# Patient Record
Sex: FEMALE | Race: WHITE | NOT HISPANIC OR LATINO | Employment: FULL TIME | URBAN - METROPOLITAN AREA
[De-identification: names, ages, dates, MRNs, and addresses within clinical notes are randomized per-mention and may not be internally consistent; named-entity substitution may affect disease eponyms.]

---

## 2017-08-18 ENCOUNTER — TRANSCRIBE ORDERS (OUTPATIENT)
Dept: ADMINISTRATIVE | Facility: HOSPITAL | Age: 57
End: 2017-08-18

## 2017-08-18 DIAGNOSIS — C50.911 MALIGNANT NEOPLASM OF RIGHT FEMALE BREAST, UNSPECIFIED SITE OF BREAST: Primary | ICD-10-CM

## 2017-11-14 ENCOUNTER — TRANSCRIBE ORDERS (OUTPATIENT)
Dept: ADMINISTRATIVE | Facility: HOSPITAL | Age: 57
End: 2017-11-14

## 2017-11-14 DIAGNOSIS — N31.0 UNINHIBITED NEUROPATHIC BLADDER, NOT ELSEWHERE CLASSIFIED: ICD-10-CM

## 2017-11-14 DIAGNOSIS — N31.9 NEUROGENIC DYSFUNCTION OF THE URINARY BLADDER: Primary | ICD-10-CM

## 2017-11-16 ENCOUNTER — HOSPITAL ENCOUNTER (OUTPATIENT)
Dept: ULTRASOUND IMAGING | Facility: HOSPITAL | Age: 57
Discharge: HOME | End: 2017-11-16
Attending: UROLOGY
Payer: COMMERCIAL

## 2017-11-16 DIAGNOSIS — N31.9 NEUROGENIC DYSFUNCTION OF THE URINARY BLADDER: ICD-10-CM

## 2017-11-16 DIAGNOSIS — N31.0 UNINHIBITED NEUROPATHIC BLADDER, NOT ELSEWHERE CLASSIFIED: ICD-10-CM

## 2017-11-16 PROCEDURE — 76770 US EXAM ABDO BACK WALL COMP: CPT

## 2018-03-14 ENCOUNTER — TELEPHONE (OUTPATIENT)
Dept: UROLOGY | Facility: CLINIC | Age: 58
End: 2018-03-14

## 2018-04-25 ENCOUNTER — TELEPHONE (OUTPATIENT)
Dept: UROLOGY | Facility: CLINIC | Age: 58
End: 2018-04-25

## 2018-04-25 NOTE — TELEPHONE ENCOUNTER
BUCKY for patient to call back to schedule previously cancelled CMG    5/3/18 4:20 pm, patient returned call to schedule CMG  Spoke to patient, would like to schedule appointment in June, but doesn't know when she is available  She will call us back when she has her schedule

## 2018-06-11 ENCOUNTER — TRANSCRIBE ORDERS (OUTPATIENT)
Dept: ADMINISTRATIVE | Facility: HOSPITAL | Age: 58
End: 2018-06-11

## 2018-06-11 DIAGNOSIS — N31.9 NEUROGENIC DYSFUNCTION OF THE URINARY BLADDER: Primary | ICD-10-CM

## 2018-06-15 ENCOUNTER — APPOINTMENT (OUTPATIENT)
Dept: LAB | Facility: CLINIC | Age: 58
End: 2018-06-15
Payer: COMMERCIAL

## 2018-06-15 DIAGNOSIS — N31.9 NEUROGENIC DYSFUNCTION OF THE URINARY BLADDER: ICD-10-CM

## 2018-06-15 LAB
ANION GAP SERPL CALCULATED.3IONS-SCNC: 10 MMOL/L (ref 4–13)
BUN SERPL-MCNC: 16 MG/DL (ref 5–25)
CALCIUM SERPL-MCNC: 8.7 MG/DL (ref 8.3–10.1)
CHLORIDE SERPL-SCNC: 100 MMOL/L (ref 100–108)
CO2 SERPL-SCNC: 28 MMOL/L (ref 21–32)
CREAT SERPL-MCNC: 0.86 MG/DL (ref 0.6–1.3)
GFR SERPL CREATININE-BSD FRML MDRD: 74 ML/MIN/1.73SQ M
GLUCOSE SERPL-MCNC: 114 MG/DL (ref 65–140)
POTASSIUM SERPL-SCNC: 3.7 MMOL/L (ref 3.5–5.3)
SODIUM SERPL-SCNC: 138 MMOL/L (ref 136–145)

## 2018-06-15 PROCEDURE — 36415 COLL VENOUS BLD VENIPUNCTURE: CPT

## 2018-06-15 PROCEDURE — 80048 BASIC METABOLIC PNL TOTAL CA: CPT

## 2018-06-19 ENCOUNTER — OFFICE VISIT (OUTPATIENT)
Dept: UROLOGY | Facility: CLINIC | Age: 58
End: 2018-06-19
Payer: COMMERCIAL

## 2018-06-19 VITALS
HEIGHT: 66 IN | SYSTOLIC BLOOD PRESSURE: 120 MMHG | DIASTOLIC BLOOD PRESSURE: 64 MMHG | BODY MASS INDEX: 23.95 KG/M2 | WEIGHT: 149 LBS

## 2018-06-19 DIAGNOSIS — N39.0 URINARY TRACT INFECTION WITHOUT HEMATURIA, SITE UNSPECIFIED: Primary | ICD-10-CM

## 2018-06-19 DIAGNOSIS — N31.9 NEUROMUSCULAR DYSFUNCTION OF BLADDER: ICD-10-CM

## 2018-06-19 LAB
SL AMB  POCT GLUCOSE, UA: NORMAL
SL AMB LEUKOCYTE ESTERASE,UA: NORMAL
SL AMB POCT BILIRUBIN,UA: NORMAL
SL AMB POCT BLOOD,UA: NORMAL
SL AMB POCT CLARITY,UA: CLEAR
SL AMB POCT COLOR,UA: YELLOW
SL AMB POCT KETONES,UA: NORMAL
SL AMB POCT NITRITE,UA: NORMAL
SL AMB POCT PH,UA: NORMAL
SL AMB POCT SPECIFIC GRAVITY,UA: NORMAL
SL AMB POCT URINE PROTEIN: NORMAL
SL AMB POCT UROBILINOGEN: NORMAL

## 2018-06-19 PROCEDURE — 99213 OFFICE O/P EST LOW 20 MIN: CPT | Performed by: UROLOGY

## 2018-06-19 PROCEDURE — 81002 URINALYSIS NONAUTO W/O SCOPE: CPT | Performed by: UROLOGY

## 2018-06-19 RX ORDER — CYANOCOBALAMIN (VITAMIN B-12) 500 MCG
400 LOZENGE ORAL
COMMUNITY

## 2018-06-19 RX ORDER — ASCORBATE CALCIUM 500 MG
500 TABLET ORAL
COMMUNITY

## 2018-06-19 RX ORDER — LETROZOLE 2.5 MG/1
2.5 TABLET, FILM COATED ORAL DAILY
COMMUNITY

## 2018-06-19 RX ORDER — CHLORAL HYDRATE 500 MG
1000 CAPSULE ORAL DAILY
COMMUNITY
End: 2019-05-20 | Stop reason: SDUPTHER

## 2018-06-19 NOTE — PROGRESS NOTES
Progress Note - Urology  Milly Kline 60 y o  female MRN: [id number]  Encounter: [id number]      Chief Complaint:   Chief Complaint   Patient presents with    Urinary Tract Infection     6 Week FU / Labs       HPI:   this is a 54-year-old female with a past history of cervical cancer treated with surgery and radiation  In addition she has had breast cancer  She has had voiding dysfunction and has been on intermittent catheterization for inability to empty  She caths 3 times a day  She states she is able to void when having a bowel movement     Laboratory data he has remained stable in view of renal function  She has had no bleeding  She has had no UTI symptoms  MEDS:    Current Outpatient Prescriptions:     aspirin 81 MG tablet, Take 81 mg by mouth, Disp: , Rfl:     Calcium Ascorbate 500 MG TABS, Take 500 mg by mouth, Disp: , Rfl:     Calcium Carb-Cholecalciferol (CALCIUM CARBONATE-VITAMIN D3 PO), Take by mouth, Disp: , Rfl:     Cranberry 1000 MG CAPS, Take 4,200 mg by mouth, Disp: , Rfl:     letrozole (FEMARA) 2 5 mg tablet, Take 2 5 mg by mouth daily, Disp: , Rfl:     Omega-3 Fatty Acids (FISH OIL) 1,000 mg, Take 1,000 mg by mouth daily, Disp: , Rfl:     Vitamin E 400 units TABS, Take 400 Units by mouth, Disp: , Rfl:       PMH:  Past Medical History:   Diagnosis Date    Breast cancer ([facility] )     Stage 1    Cervical cancer ([facility] )          PSH  Past Surgical History:   Procedure Laterality Date    BREAST LUMPECTOMY Right 09/26/2017    BREAST LUMPECTOMY      PARTIAL HYSTERECTOMY           ROS:  Review of Systems      Vitals:  Blood pressure 120/64, height 5' 6" (1 676 m), weight 67 6 kg (149 lb)  Physical Exam:     Exam shows a pleasant female in no acute distress  Her back shows no CVA tenderness  The abdomen is soft  Appreciate no masses  There is no inguinal adenopathy    The bladder is not distended pelvic examination reveals an obliterated vaginal canal   There is hyperemia noted around the vaginal introitus  Lab, Imaging and other studies:  Recent Results (from the past 672 hour(s))   Basic metabolic panel    Collection Time: 06/15/18  5:07 PM   Result Value Ref Range    Sodium 138 136 - 145 mmol/L    Potassium 3 7 3 5 - 5 3 mmol/L    Chloride 100 100 - 108 mmol/L    CO2 28 21 - 32 mmol/L    Anion Gap 10 4 - 13 mmol/L    BUN 16 5 - 25 mg/dL    Creatinine 0 86 0 60 - 1 30 mg/dL    Glucose 114 65 - 140 mg/dL    Calcium 8 7 8 3 - 10 1 mg/dL    eGFR 74 ml/min/1 73sq m   POCT urine dip    Collection Time: 06/19/18  4:13 PM   Result Value Ref Range    LEUKOCYTE ESTERASE,UA NEG      NITRITE,UA NEG     SL AMB POCT UROBILINOGEN NEG     SL AMB POCT URINE PROTEIN NEG      PH,UA NEG      BLOOD,UA NEG      SPECIFIC GRAVITY,UA NEG      KETONES,UA NEG      BILIRUBIN,UA NEG     GLUCOSE, UA NEG      COLOR,UA Yellow      CLARITY,UA Clear            IMPRESSION:    Neurogenic bladder    PLAN:   I would recheck her in 6 months get a renal ultrasound at that time  She will continue with intermittent catheterization which she does 3 times daily  She will also catheter additional time if she feels distension and pressure

## 2018-07-21 ENCOUNTER — TRANSCRIBE ORDERS (OUTPATIENT)
Dept: LAB | Facility: CLINIC | Age: 58
End: 2018-07-21

## 2018-07-21 ENCOUNTER — APPOINTMENT (OUTPATIENT)
Dept: LAB | Facility: CLINIC | Age: 58
End: 2018-07-21
Payer: COMMERCIAL

## 2018-07-21 DIAGNOSIS — Z17.0 ESTROGEN RECEPTOR POSITIVE: ICD-10-CM

## 2018-07-21 DIAGNOSIS — E78.2: Primary | ICD-10-CM

## 2018-07-21 DIAGNOSIS — C50.411 CARCINOMA OF UPPER-OUTER QUADRANT OF FEMALE BREAST, RIGHT (HCC): ICD-10-CM

## 2018-07-21 DIAGNOSIS — E78.2: ICD-10-CM

## 2018-07-21 LAB
ALBUMIN SERPL BCP-MCNC: 3.9 G/DL (ref 3.5–5)
ALP SERPL-CCNC: 72 U/L (ref 46–116)
ALT SERPL W P-5'-P-CCNC: 116 U/L (ref 12–78)
AST SERPL W P-5'-P-CCNC: 87 U/L (ref 5–45)
BILIRUB DIRECT SERPL-MCNC: 0.14 MG/DL (ref 0–0.2)
BILIRUB SERPL-MCNC: 0.8 MG/DL (ref 0.2–1)
CHOLEST SERPL-MCNC: 286 MG/DL (ref 50–200)
HDLC SERPL-MCNC: 63 MG/DL (ref 40–60)
LDLC SERPL CALC-MCNC: 178 MG/DL (ref 0–100)
NONHDLC SERPL-MCNC: 223 MG/DL
PROT SERPL-MCNC: 7.7 G/DL (ref 6.4–8.2)
TRIGL SERPL-MCNC: 225 MG/DL

## 2018-07-21 PROCEDURE — 80076 HEPATIC FUNCTION PANEL: CPT

## 2018-07-21 PROCEDURE — 36415 COLL VENOUS BLD VENIPUNCTURE: CPT

## 2018-07-21 PROCEDURE — 80061 LIPID PANEL: CPT

## 2018-11-02 ENCOUNTER — APPOINTMENT (OUTPATIENT)
Dept: LAB | Facility: CLINIC | Age: 58
End: 2018-11-02
Payer: COMMERCIAL

## 2018-11-02 ENCOUNTER — TRANSCRIBE ORDERS (OUTPATIENT)
Dept: LAB | Facility: CLINIC | Age: 58
End: 2018-11-02

## 2018-11-02 DIAGNOSIS — Z17.0 ESTROGEN RECEPTOR POSITIVE: ICD-10-CM

## 2018-11-02 DIAGNOSIS — C50.411 MALIGNANT NEOPLASM OF UPPER-OUTER QUADRANT OF RIGHT BREAST IN FEMALE, ESTROGEN RECEPTOR POSITIVE (HCC): Primary | ICD-10-CM

## 2018-11-02 DIAGNOSIS — R79.89 ELEVATED LFTS: ICD-10-CM

## 2018-11-02 DIAGNOSIS — R94.5 NONSPECIFIC ABNORMAL RESULTS OF LIVER FUNCTION STUDY: ICD-10-CM

## 2018-11-02 DIAGNOSIS — C50.411 MALIGNANT NEOPLASM OF UPPER-OUTER QUADRANT OF RIGHT BREAST IN FEMALE, ESTROGEN RECEPTOR POSITIVE (HCC): ICD-10-CM

## 2018-11-02 DIAGNOSIS — Z17.0 MALIGNANT NEOPLASM OF UPPER-OUTER QUADRANT OF RIGHT BREAST IN FEMALE, ESTROGEN RECEPTOR POSITIVE (HCC): Primary | ICD-10-CM

## 2018-11-02 DIAGNOSIS — Z17.0 MALIGNANT NEOPLASM OF UPPER-OUTER QUADRANT OF RIGHT BREAST IN FEMALE, ESTROGEN RECEPTOR POSITIVE (HCC): ICD-10-CM

## 2018-11-02 DIAGNOSIS — E78.2 MIXED HYPERLIPIDEMIA: ICD-10-CM

## 2018-11-02 DIAGNOSIS — E78.2 MIXED HYPERLIPIDEMIA: Primary | ICD-10-CM

## 2018-11-02 LAB
ALBUMIN SERPL BCP-MCNC: 4.1 G/DL (ref 3.5–5)
ALP SERPL-CCNC: 72 U/L (ref 46–116)
ALT SERPL W P-5'-P-CCNC: 41 U/L (ref 12–78)
ANION GAP SERPL CALCULATED.3IONS-SCNC: 11 MMOL/L (ref 4–13)
AST SERPL W P-5'-P-CCNC: 29 U/L (ref 5–45)
BILIRUB DIRECT SERPL-MCNC: 0.2 MG/DL (ref 0–0.2)
BILIRUB SERPL-MCNC: 1.1 MG/DL (ref 0.2–1)
BUN SERPL-MCNC: 19 MG/DL (ref 5–25)
CALCIUM SERPL-MCNC: 9.5 MG/DL (ref 8.3–10.1)
CHLORIDE SERPL-SCNC: 104 MMOL/L (ref 100–108)
CHOLEST SERPL-MCNC: 168 MG/DL (ref 50–200)
CO2 SERPL-SCNC: 28 MMOL/L (ref 21–32)
CREAT SERPL-MCNC: 0.89 MG/DL (ref 0.6–1.3)
GFR SERPL CREATININE-BSD FRML MDRD: 71 ML/MIN/1.73SQ M
GLUCOSE P FAST SERPL-MCNC: 106 MG/DL (ref 65–99)
HCV AB SER QL: NORMAL
HDLC SERPL-MCNC: 76 MG/DL (ref 40–60)
LDLC SERPL CALC-MCNC: 60 MG/DL (ref 0–100)
NONHDLC SERPL-MCNC: 92 MG/DL
POTASSIUM SERPL-SCNC: 3.9 MMOL/L (ref 3.5–5.3)
PROT SERPL-MCNC: 8 G/DL (ref 6.4–8.2)
SODIUM SERPL-SCNC: 143 MMOL/L (ref 136–145)
TRIGL SERPL-MCNC: 161 MG/DL

## 2018-11-02 PROCEDURE — 80061 LIPID PANEL: CPT

## 2018-11-02 PROCEDURE — 86803 HEPATITIS C AB TEST: CPT

## 2018-11-02 PROCEDURE — 80053 COMPREHEN METABOLIC PANEL: CPT

## 2018-11-02 PROCEDURE — 36415 COLL VENOUS BLD VENIPUNCTURE: CPT

## 2018-11-02 PROCEDURE — 82248 BILIRUBIN DIRECT: CPT

## 2018-12-19 ENCOUNTER — HOSPITAL ENCOUNTER (OUTPATIENT)
Dept: ULTRASOUND IMAGING | Facility: HOSPITAL | Age: 58
Discharge: HOME | End: 2018-12-19
Payer: COMMERCIAL

## 2018-12-19 DIAGNOSIS — N31.9 NEUROMUSCULAR DYSFUNCTION OF BLADDER: ICD-10-CM

## 2018-12-19 PROCEDURE — 76770 US EXAM ABDO BACK WALL COMP: CPT

## 2018-12-28 ENCOUNTER — OFFICE VISIT (OUTPATIENT)
Dept: UROLOGY | Facility: CLINIC | Age: 58
End: 2018-12-28
Payer: COMMERCIAL

## 2018-12-28 VITALS
SYSTOLIC BLOOD PRESSURE: 110 MMHG | HEIGHT: 66 IN | BODY MASS INDEX: 23.63 KG/M2 | WEIGHT: 147 LBS | DIASTOLIC BLOOD PRESSURE: 68 MMHG

## 2018-12-28 DIAGNOSIS — N39.0 URINARY TRACT INFECTION WITHOUT HEMATURIA, SITE UNSPECIFIED: Primary | ICD-10-CM

## 2018-12-28 LAB
SL AMB  POCT GLUCOSE, UA: ABNORMAL
SL AMB LEUKOCYTE ESTERASE,UA: ABNORMAL
SL AMB POCT BILIRUBIN,UA: ABNORMAL
SL AMB POCT BLOOD,UA: ABNORMAL
SL AMB POCT CLARITY,UA: ABNORMAL
SL AMB POCT COLOR,UA: YELLOW
SL AMB POCT KETONES,UA: ABNORMAL
SL AMB POCT NITRITE,UA: ABNORMAL
SL AMB POCT PH,UA: 7
SL AMB POCT SPECIFIC GRAVITY,UA: 1.01
SL AMB POCT URINE PROTEIN: ABNORMAL
SL AMB POCT UROBILINOGEN: ABNORMAL

## 2018-12-28 PROCEDURE — P9612 CATHETERIZE FOR URINE SPEC: HCPCS | Performed by: PHYSICIAN ASSISTANT

## 2018-12-28 PROCEDURE — 87077 CULTURE AEROBIC IDENTIFY: CPT | Performed by: PHYSICIAN ASSISTANT

## 2018-12-28 PROCEDURE — 81002 URINALYSIS NONAUTO W/O SCOPE: CPT | Performed by: PHYSICIAN ASSISTANT

## 2018-12-28 PROCEDURE — 99213 OFFICE O/P EST LOW 20 MIN: CPT | Performed by: PHYSICIAN ASSISTANT

## 2018-12-28 PROCEDURE — 87186 SC STD MICRODIL/AGAR DIL: CPT | Performed by: PHYSICIAN ASSISTANT

## 2018-12-28 PROCEDURE — 87086 URINE CULTURE/COLONY COUNT: CPT | Performed by: PHYSICIAN ASSISTANT

## 2018-12-28 RX ORDER — ATORVASTATIN CALCIUM 20 MG/1
TABLET, FILM COATED ORAL
COMMUNITY
Start: 2018-10-26 | End: 2020-08-13 | Stop reason: SDUPTHER

## 2018-12-28 NOTE — PROGRESS NOTES
UROLOGY PROGRESS NOTE   Patient Identifiers: Verenice Lin (MRN [id number])  Date of Service: 12/28/2018    Subjective:     30-year-old female history of voiding dysfunction  She does self catheterization 3 times a day  Follow-up kidney and bladder ultrasound other than bladder thickening was unremarkable  Her urine shows leukocytes and is foul smelling although she is asymptomatic  Patient has  no complaints        Objective:     VITALS:    Vitals:    12/28/18 1354   BP: 110/68           LABS:  Lab Results   Component Value Date    HGB 12 6 03/20/2014    HCT 39 3 03/20/2014    WBC 7 90 03/20/2014     03/20/2014   ]    Lab Results   Component Value Date     10/22/2015    K 3 9 11/02/2018     11/02/2018    CO2 28 11/02/2018    BUN 19 11/02/2018    CREATININE 0 89 11/02/2018    CALCIUM 9 5 11/02/2018    GLUCOSE 92 10/22/2015   ]        INPATIENT MEDS:    Current Outpatient Prescriptions:     aspirin 81 MG tablet, Take 81 mg by mouth, Disp: , Rfl:     atorvastatin (LIPITOR) 20 mg tablet, TAKE 1 TABLET BY MOUTH EVERYDAY AT BEDTIME, Disp: , Rfl:     Calcium Ascorbate 500 MG TABS, Take 500 mg by mouth, Disp: , Rfl:     Calcium Carb-Cholecalciferol (CALCIUM CARBONATE-VITAMIN D3 PO), Take by mouth, Disp: , Rfl:     Cranberry 1000 MG CAPS, Take 4,200 mg by mouth, Disp: , Rfl:     KRILL OIL PO, Take by mouth, Disp: , Rfl:     letrozole (FEMARA) 2 5 mg tablet, Take 2 5 mg by mouth daily, Disp: , Rfl:     Omega-3 Fatty Acids (FISH OIL) 1,000 mg, Take 1,000 mg by mouth daily, Disp: , Rfl:     Vitamin E 400 units TABS, Take 400 Units by mouth, Disp: , Rfl:       Physical Exam:   /68 (BP Location: Left arm, Patient Position: Sitting, Cuff Size: Adult)   Ht 5' 6" (1 676 m)   Wt 66 7 kg (147 lb)   BMI 23 73 kg/m²   GEN: no acute distress    RESP: breathing comfortably with no accessory muscle use    ABD: soft, non-tender, non-distended   INCISION:    EXT: no significant peripheral edema RADIOLOGY:    RENAL ULTRASOUND     IMPRESSION:  1  Abnormal sonographic appearance of the urinary bladder, consistent with the patient's known history of neurogenic bladder  The post void bladder volume has increased since the prior study  2   Nonobstructing 7 mm right lower pole renal calculus  3   Mild bilateral caliectasis, likely related to chronic vesicoureteral reflux      Assessment:     1  Voiding dysfunction   2   Chronic urinary retention    Plan:   - follow-up in 1 year for her annual exam  -  -  -

## 2018-12-31 LAB — BACTERIA UR CULT: ABNORMAL

## 2019-01-03 ENCOUNTER — TELEPHONE (OUTPATIENT)
Dept: UROLOGY | Facility: CLINIC | Age: 59
End: 2019-01-03

## 2019-01-03 DIAGNOSIS — N39.0 URINARY TRACT INFECTION WITHOUT HEMATURIA, SITE UNSPECIFIED: Primary | ICD-10-CM

## 2019-01-03 RX ORDER — SULFAMETHOXAZOLE AND TRIMETHOPRIM 800; 160 MG/1; MG/1
1 TABLET ORAL EVERY 12 HOURS SCHEDULED
Qty: 14 TABLET | Refills: 0 | Status: SHIPPED | OUTPATIENT
Start: 2019-01-03 | End: 2019-01-10

## 2019-01-03 NOTE — TELEPHONE ENCOUNTER
I called patient and left a message with her urine culture results and informed her BRANDON Dhillon sent Bactrim to her [facility] pharmacy     ----- Message from Lauren Boyce PA-C sent at 1/3/2019  1:58 PM EST -----   Could let her know I sent an antibiotic to your pharmacy please  ----- Message -----  From: Smiley Lopez MA  Sent: 12/28/2018   2:05 PM  To: Lauren Boyce PA-C

## 2019-01-31 ENCOUNTER — TELEPHONE (OUTPATIENT)
Dept: UROLOGY | Facility: CLINIC | Age: 59
End: 2019-01-31

## 2019-01-31 ENCOUNTER — APPOINTMENT (OUTPATIENT)
Dept: LAB | Facility: CLINIC | Age: 59
End: 2019-01-31
Payer: COMMERCIAL

## 2019-01-31 ENCOUNTER — TRANSCRIBE ORDERS (OUTPATIENT)
Dept: LAB | Facility: CLINIC | Age: 59
End: 2019-01-31

## 2019-01-31 DIAGNOSIS — R30.9 PAINFUL URINATION: Primary | ICD-10-CM

## 2019-01-31 DIAGNOSIS — N39.0 ACUTE UTI: Primary | ICD-10-CM

## 2019-01-31 DIAGNOSIS — R30.9 PAINFUL URINATION: ICD-10-CM

## 2019-01-31 LAB
BACTERIA UR QL AUTO: ABNORMAL /HPF
BILIRUB UR QL STRIP: NEGATIVE
CLARITY UR: ABNORMAL
COLOR UR: YELLOW
GLUCOSE UR STRIP-MCNC: NEGATIVE MG/DL
HGB UR QL STRIP.AUTO: ABNORMAL
KETONES UR STRIP-MCNC: NEGATIVE MG/DL
LEUKOCYTE ESTERASE UR QL STRIP: ABNORMAL
NITRITE UR QL STRIP: POSITIVE
NON-SQ EPI CELLS URNS QL MICRO: ABNORMAL /HPF
PH UR STRIP.AUTO: 7 [PH] (ref 4.5–8)
PROT UR STRIP-MCNC: ABNORMAL MG/DL
RBC #/AREA URNS AUTO: ABNORMAL /HPF
SP GR UR STRIP.AUTO: 1.02 (ref 1–1.03)
UROBILINOGEN UR QL STRIP.AUTO: 0.2 E.U./DL
WBC #/AREA URNS AUTO: ABNORMAL /HPF

## 2019-01-31 PROCEDURE — 81001 URINALYSIS AUTO W/SCOPE: CPT

## 2019-01-31 PROCEDURE — 87077 CULTURE AEROBIC IDENTIFY: CPT

## 2019-01-31 PROCEDURE — 87186 SC STD MICRODIL/AGAR DIL: CPT

## 2019-01-31 PROCEDURE — 87086 URINE CULTURE/COLONY COUNT: CPT

## 2019-01-31 NOTE — TELEPHONE ENCOUNTER
Patient called and stated that she self caths 3 or 4 times about and about an hour after every time she has pain and burning  Patient had urinary tract infection earlier this month  Is requesting antibiotic  I advised patient that we would place order for urine culture and call with results when labs are back  Advised this may take 2-3 days  Patient states she will go to [facility] later today

## 2019-01-31 NOTE — TELEPHONE ENCOUNTER
Agree with nurse's recommendations  Will prescribe antibiotics based on final urine culture results    Encourage patient to increase water intake

## 2019-02-01 RX ORDER — SULFAMETHOXAZOLE AND TRIMETHOPRIM 800; 160 MG/1; MG/1
1 TABLET ORAL EVERY 12 HOURS SCHEDULED
Qty: 10 TABLET | Refills: 0 | Status: SHIPPED | OUTPATIENT
Start: 2019-02-01 | End: 2019-02-04 | Stop reason: ALTCHOICE

## 2019-02-01 NOTE — TELEPHONE ENCOUNTER
Called and advised patient of below  Patient understands and will  the prescription and start today  Will call patient on Monday if prescription needs to be changed

## 2019-02-01 NOTE — TELEPHONE ENCOUNTER
Reviewed results of recent urine culture which shows presence of bacteria, however final sensitivity report remains pending  Due to report of symptoms and upcoming weekend, will send prescription for Bactrim, based on prior sensitivity  This was electronically sent to her [facility] pharmacy  Please advise patient that someone from office will call if she requires a different antibiotic, as final culture is pending

## 2019-02-01 NOTE — TELEPHONE ENCOUNTER
Patient called to find out if results of urine testing is back  Advised that the culture will problem not be back today, but the UA is back  Patients states symptoms are worsening  She has increased her water intake  I advised that I will have the NP take a look and maybe order something today  Patient asks that I call her back at her work number today

## 2019-02-02 LAB — BACTERIA UR CULT: ABNORMAL

## 2019-02-04 RX ORDER — NITROFURANTOIN 25; 75 MG/1; MG/1
100 CAPSULE ORAL 2 TIMES DAILY
Qty: 10 CAPSULE | Refills: 0 | Status: SHIPPED | OUTPATIENT
Start: 2019-02-04 | End: 2019-02-09

## 2019-02-04 NOTE — TELEPHONE ENCOUNTER
Please inform patient final sensitivity is back, however Bactrim was resistant to organism  Will switch antibiotic to Macrobid  Prescription electronically sent to her [facility] pharmacy

## 2019-02-05 NOTE — TELEPHONE ENCOUNTER
Patient calling in reference to her UTI and is requesting a call back  Patient started medication and her fever jumped to 103 2   Patient is taking Aleve   Please call [phone]

## 2019-02-05 NOTE — TELEPHONE ENCOUNTER
Patient started the correct antibiotic this morning  Patient states that her urinary symptoms are improving  Denies any other symptoms besides the fever  Advised patient to stop the Aleve and to alternate Ibuprofen and Tylenol for the fever  Will call patient tomorrow to see how she is feeling  Patient to call the on call doctor over night if symptoms worsen

## 2019-02-06 NOTE — TELEPHONE ENCOUNTER
Called patient to check on her  Patient states she is feeling better  She is continuing to alternate Tylenol and Ibuprofen right now  Advised patient to continue as long as she has the fever and to finish out course of antibiotics  Patient instructed to call office if fever worsens despite medication or with any increasing urinary symptoms  Patient verbalized understanding and agrees with plan  Patient states that she will reach back out to me on Friday to let me know how she is doing before the weekend

## 2019-02-14 ENCOUNTER — APPOINTMENT (OUTPATIENT)
Dept: LAB | Facility: CLINIC | Age: 59
End: 2019-02-14
Payer: COMMERCIAL

## 2019-02-14 ENCOUNTER — TRANSCRIBE ORDERS (OUTPATIENT)
Dept: LAB | Facility: CLINIC | Age: 59
End: 2019-02-14

## 2019-02-14 DIAGNOSIS — Z17.0 ESTROGEN RECEPTOR POSITIVE: ICD-10-CM

## 2019-02-14 DIAGNOSIS — C50.411 MALIGNANT NEOPLASM OF UPPER-OUTER QUADRANT OF RIGHT FEMALE BREAST, UNSPECIFIED ESTROGEN RECEPTOR STATUS (HCC): ICD-10-CM

## 2019-02-14 DIAGNOSIS — I74.4 THROMBOSIS OF ARTERIES OF THE EXTREMITIES (HCC): ICD-10-CM

## 2019-02-14 DIAGNOSIS — I74.4 THROMBOSIS OF ARTERIES OF THE EXTREMITIES (HCC): Primary | ICD-10-CM

## 2019-02-14 LAB
ALBUMIN SERPL BCP-MCNC: 3.2 G/DL (ref 3.5–5)
ALP SERPL-CCNC: 62 U/L (ref 46–116)
ALT SERPL W P-5'-P-CCNC: 37 U/L (ref 12–78)
AST SERPL W P-5'-P-CCNC: 21 U/L (ref 5–45)
BILIRUB DIRECT SERPL-MCNC: 0.09 MG/DL (ref 0–0.2)
BILIRUB SERPL-MCNC: 0.4 MG/DL (ref 0.2–1)
PROT SERPL-MCNC: 7 G/DL (ref 6.4–8.2)

## 2019-02-14 PROCEDURE — 80076 HEPATIC FUNCTION PANEL: CPT

## 2019-02-14 PROCEDURE — 36415 COLL VENOUS BLD VENIPUNCTURE: CPT

## 2019-03-04 ENCOUNTER — TELEPHONE (OUTPATIENT)
Dept: UROLOGY | Facility: CLINIC | Age: 59
End: 2019-03-04

## 2019-03-04 ENCOUNTER — APPOINTMENT (OUTPATIENT)
Dept: LAB | Facility: CLINIC | Age: 59
End: 2019-03-04
Payer: COMMERCIAL

## 2019-03-04 DIAGNOSIS — N39.0 FREQUENT UTI: Primary | ICD-10-CM

## 2019-03-04 DIAGNOSIS — N39.0 FREQUENT UTI: ICD-10-CM

## 2019-03-04 LAB
BACTERIA UR QL AUTO: ABNORMAL /HPF
BILIRUB UR QL STRIP: NEGATIVE
CLARITY UR: CLEAR
COLOR UR: YELLOW
GLUCOSE UR STRIP-MCNC: NEGATIVE MG/DL
HGB UR QL STRIP.AUTO: NEGATIVE
KETONES UR STRIP-MCNC: NEGATIVE MG/DL
LEUKOCYTE ESTERASE UR QL STRIP: NEGATIVE
NITRITE UR QL STRIP: POSITIVE
NON-SQ EPI CELLS URNS QL MICRO: ABNORMAL /HPF
PH UR STRIP.AUTO: 8 [PH] (ref 4.5–8)
PROT UR STRIP-MCNC: NEGATIVE MG/DL
RBC #/AREA URNS AUTO: ABNORMAL /HPF
SP GR UR STRIP.AUTO: 1.01 (ref 1–1.03)
UROBILINOGEN UR QL STRIP.AUTO: 0.2 E.U./DL
WBC #/AREA URNS AUTO: ABNORMAL /HPF

## 2019-03-04 PROCEDURE — 87077 CULTURE AEROBIC IDENTIFY: CPT

## 2019-03-04 PROCEDURE — 87186 SC STD MICRODIL/AGAR DIL: CPT

## 2019-03-04 PROCEDURE — 87086 URINE CULTURE/COLONY COUNT: CPT

## 2019-03-04 PROCEDURE — 81001 URINALYSIS AUTO W/SCOPE: CPT | Performed by: PHYSICIAN ASSISTANT

## 2019-03-04 NOTE — TELEPHONE ENCOUNTER
Patient has been experiencing frequent UTIs  Per patient, right now her only symptom is a low grade fever  I explained to patient that we can not treat her based on that symptom alone  Will order urine testing and call patient with results  In meantime, patient can take Tylenol or Motrin for fever

## 2019-03-05 DIAGNOSIS — N39.0 URINARY TRACT INFECTION WITHOUT HEMATURIA, SITE UNSPECIFIED: Primary | ICD-10-CM

## 2019-03-05 RX ORDER — CEFUROXIME AXETIL 500 MG/1
500 TABLET ORAL EVERY 12 HOURS SCHEDULED
Qty: 14 TABLET | Refills: 0 | Status: SHIPPED | OUTPATIENT
Start: 2019-03-05 | End: 2019-03-12

## 2019-03-06 ENCOUNTER — TELEPHONE (OUTPATIENT)
Dept: UROLOGY | Facility: CLINIC | Age: 59
End: 2019-03-06

## 2019-03-06 LAB — BACTERIA UR CULT: ABNORMAL

## 2019-03-06 NOTE — TELEPHONE ENCOUNTER
Called patient to discuss her concerns  She has had three UTI's since December, all come back as e coli on urine culture  Advised patient that are many reasons for UTI's but that we should get to the bottom of why she is continuing to get them  Advised patient that we could schedule her for tomorrow  She opted to schedule for Friday with Ilda Larose at [address] [place] [address]   In meantime she has started Ceftin prescribed by Rodger Crabtree PA-C

## 2019-03-08 ENCOUNTER — OFFICE VISIT (OUTPATIENT)
Dept: UROLOGY | Facility: AMBULATORY SURGERY CENTER | Age: 59
End: 2019-03-08
Payer: COMMERCIAL

## 2019-03-08 VITALS
WEIGHT: 139 LBS | HEART RATE: 68 BPM | SYSTOLIC BLOOD PRESSURE: 118 MMHG | BODY MASS INDEX: 22.34 KG/M2 | DIASTOLIC BLOOD PRESSURE: 72 MMHG | HEIGHT: 66 IN

## 2019-03-08 DIAGNOSIS — N31.9 NEUROGENIC BLADDER: ICD-10-CM

## 2019-03-08 DIAGNOSIS — N20.0 NEPHROLITHIASIS: Primary | ICD-10-CM

## 2019-03-08 DIAGNOSIS — N39.0 RECURRENT UTI: ICD-10-CM

## 2019-03-08 PROCEDURE — 99214 OFFICE O/P EST MOD 30 MIN: CPT | Performed by: NURSE PRACTITIONER

## 2019-03-08 NOTE — PROGRESS NOTES
3/8/2019    [facility]  1960  [id number]        Assessment  -Recurrent UTIs  -Neurogenic bladder   -Nephrolithiasis     Discussion/Plan  Stewart Paulson is a 60 y o  female previously known to Dr Lucrecia Doyle    -We discussed patient's recurrent UTIs  She will finish course of antibiotics as prescribed by Néstor Taveras  I recommend proceeding with in office flexible cystoscopy for new recurrence of UTI, history of intermittent CIC, and gross hematuria  Patient states she has never undergone cystoscopy  Reviewed procedure including all risks and benefits  A recent renal US was obtained in December 2018 which was negative for any significant findings  She was noted to have nonbstructing renal calculus, which we will continue to observe  Reviewed dietary modifications  We did discuss that patient is likely colonized with bacteria given longstanding history of CIC  Would recommend prescribing antibiotics in the future, if patient reports lower urinary tract symptoms, gross hematuria, fever >101, chills, signs of infection, etc   Patient verbalized understanding     -Follow up for in office flexible cystoscopy with MD  -All questions answered, patients agree with plan     History of Present Illness  60 y o  female with a history of recurrent UTI and neurogenic bladder presents today for follow up  She is previously known to Dr Lucrecia Doyle  Patient states she has been performing intermittent CIC, 3x/day for several years, per Dr Natalie Guerrero recommendations  Incomplete bladder emptying secondary to radiation therapy for cervical cancer  She denies any difficulties passing catheter  She is noted to have 3 UTIs, positive for E  Coli organism, since December 2018  Most recently patient had reported fever, and was prescribed 7 day course of Ceftin 500mg by Néstor Taveras on 3/5/19  Last renal US from 12/2018 identified mild bladder wall thickening and 7mm nonobstructing right lower pole calculus    Patient also reports episode of gross hematuria earlier this year  She states having initial symptoms of dysuria, and undocumented fever, which prompts her to receive urine testing  Patient states she has never undergone cystoscopy for further evaluation  She denies any strong family history of bladder or kidney malignancies  Patient is currently asymptomatic  Review of Systems  Review of Systems   Constitutional: Negative  HENT: Negative  Respiratory: Negative  Cardiovascular: Negative  Gastrointestinal: Negative  Genitourinary: Negative for decreased urine volume, difficulty urinating, dysuria, flank pain, frequency, hematuria and urgency  Musculoskeletal: Negative  Skin: Negative  Neurological: Negative  Psychiatric/Behavioral: Negative  Past Medical History  Past Medical History:   Diagnosis Date    Breast cancer ([facility] )     Stage 1    Cervical cancer ([facility] )        Past Social History  Past Surgical History:   Procedure Laterality Date    BREAST LUMPECTOMY Right 09/26/2017    BREAST LUMPECTOMY      PARTIAL HYSTERECTOMY         Past Family History  Family History   Problem Relation Age of Onset    Lung cancer Mother        Past Social history  Social History     Socioeconomic History    Marital status: /Civil Union     Spouse name: Not on file    Number of children: Not on file    Years of education: Not on file    Highest education level: Not on file   Occupational History    Not on file   Social Needs    Financial resource strain: Not on file    Food insecurity:     Worry: Not on file     Inability: Not on file    Transportation needs:     Medical: Not on file     Non-medical: Not on file   Tobacco Use    Smoking status: Former Smoker    Smokeless tobacco: Never Used   Substance and Sexual Activity    Alcohol use:  Yes     Alcohol/week: 1 8 oz     Types: 3 Standard drinks or equivalent per week    Drug use: No    Sexual activity: Not on file   Lifestyle    Physical activity:     Days per week: Not on file     Minutes per session: Not on file    Stress: Not on file   Relationships    Social connections:     Talks on phone: Not on file     Gets together: Not on file     Attends Jew service: Not on file     Active member of club or organization: Not on file     Attends meetings of clubs or organizations: Not on file     Relationship status: Not on file    Intimate partner violence:     Fear of current or ex partner: Not on file     Emotionally abused: Not on file     Physically abused: Not on file     Forced sexual activity: Not on file   Other Topics Concern    Not on file   Social History Narrative    Not on file       Current Medications  Current Outpatient Medications   Medication Sig Dispense Refill    aspirin 81 MG tablet Take 81 mg by mouth      atorvastatin (LIPITOR) 20 mg tablet TAKE 1 TABLET BY MOUTH EVERYDAY AT BEDTIME      Calcium Ascorbate 500 MG TABS Take 500 mg by mouth      Calcium Carb-Cholecalciferol (CALCIUM CARBONATE-VITAMIN D3 PO) Take by mouth      cefuroxime (CEFTIN) 500 mg tablet Take 1 tablet (500 mg total) by mouth every 12 (twelve) hours for 7 days 14 tablet 0    Cranberry 1000 MG CAPS Take 4,200 mg by mouth      KRILL OIL PO Take by mouth      letrozole (FEMARA) 2 5 mg tablet Take 2 5 mg by mouth daily      Omega-3 Fatty Acids (FISH OIL) 1,000 mg Take 1,000 mg by mouth daily      Vitamin E 400 units TABS Take 400 Units by mouth       No current facility-administered medications for this visit  Allergies  No Known Allergies    Past medical history, social history, family history, medications and allergies were reviewed  Vitals  There were no vitals filed for this visit  Physical Exam  Physical Exam   Constitutional: She is oriented to person, place, and time  She appears well-developed and well-nourished  HENT:   Head: Normocephalic  Eyes: Pupils are equal, round, and reactive to light     Neck: Normal range of motion  Cardiovascular: Normal rate and regular rhythm  Pulmonary/Chest: Effort normal    Abdominal: Soft  Normal appearance  There is no CVA tenderness  Genitourinary:   Genitourinary Comments: No suprapubic discomfort or distension   Negative CVA tenderness   Musculoskeletal: Normal range of motion  Neurological: She is alert and oriented to person, place, and time  Skin: Skin is warm and dry  Psychiatric: She has a normal mood and affect  Her behavior is normal  Judgment and thought content normal        Results    I have personally reviewed all pertinent lab results and reviewed with patient  Lab Results   Component Value Date    GLUCOSE 92 10/22/2015    CALCIUM 9 5 11/02/2018     10/22/2015    K 3 9 11/02/2018    CO2 28 11/02/2018     11/02/2018    BUN 19 11/02/2018    CREATININE 0 89 11/02/2018     Lab Results   Component Value Date    WBC 7 90 03/20/2014    HGB 12 6 03/20/2014    HCT 39 3 03/20/2014    MCV 92 03/20/2014     03/20/2014     No results found for this or any previous visit (from the past 1 hour(s))

## 2019-03-08 NOTE — PATIENT INSTRUCTIONS
Cystoscopy   AMBULATORY CARE:   A cystoscopy  is a procedure to look inside of your urethra and bladder using a cystoscope  A cystoscope is a small tube with a light and magnifying camera on the end  The procedure is used to diagnose and treat conditions of the bladder, urethra, and prostate  The procedure is also done to remove stones or blood clots from the urethra or bladder  Your healthcare provider may do other tests, such as ureteroscopy, during a cystoscopy  Prepare for your cystoscopy: You may need to stop smoking several days before your procedure, if you are having general anesthesia  Tell your healthcare provider what medicines you take  Your healthcare provider will tell you what medicines to take and not to take on the day of your procedure  You may need to stop taking medicines such as anticoagulants, aspirin, and ibuprofen several days before your procedure  He may tell you stop eating after midnight the night before your procedure  You may be asked to drink a large amount of liquids before your procedure  Make plans for someone to drive you home after your procedure  During your cystoscopy:   · You may be given general anesthesia to keep you asleep and pain free during your procedure  Your healthcare provider may give you anesthesia in your spine  With spinal anesthesia the lower part of your body will be numb  You will not feel pain during your procedure  Your healthcare provider may instead use local anesthesia that is put into your urethra and bladder  You will not feel pain, but you may be able to feel some pressure during your procedure  With local anesthesia, you may feel burning or need to urinate when the cystoscope is put in and removed  · You will be placed on your back and your feet may be placed in stirrups  The cystoscope will be will be placed through your urethra and into your bladder   The urologist will look at the walls of your urethra as the scope goes through to your bladder  Your bladder may be filled with an irrigation liquid to help your urologist see inside of your bladder more clearly   Special tools may used to remove tissue or stones  Your urologist may use a special tool to stop bleeding in your bladder  If there are blood clots in your bladder, your healthcare provider will inject an irrigation fluid into your bladder  Then he will use suction to remove the fluid and blood clots  After a cystoscopy:  After you are fully awake, you will go home  After your cystoscopy, it is normal to have pink-colored urine  It is also normal to have an increased need to urinate  You may have burning when you urinate  If you had general anesthesia, it may take at least 24 hours before you feel like your usual self  Risks of a cystoscopy: You may bleed more than expected or develop an infection  Swelling caused by the cystoscopy may cause a blockage or slow urine flow  Seek care immediately if:   · Your urine turns from pink to red, or you have clots in your urine  · You cannot urinate and your bladder feels full  · Your pain or burning becomes worse or lasts longer than 2 days  Contact your healthcare provider or urologist if:   · Your urine stays pink for longer than 3 days  · Your pain or burning becomes worse  · Your skin is itchy, swollen, or has a new rash  · You have a fever and chills  · You have questions or concerns about your condition or care  After your cystoscopy: It is normal to have pink-colored urine  It is also normal to have an increased need to urinate and burning when you urinate  If you had general anesthesia, it may take at least 24 hours before you feel like your usual self  · Drink at least 3 to 4 glasses of water daily for 2 days after your procedure  Avoid acidic juices such as orange juice and lemonade  Water can help prevent blood clots from forming   It can also help decrease the amount of acid in your urine that can cause burning  · Sit in a warm tub of water  Warm baths relieve pain and bladder spasms  · Do not have sex  until your healthcare provider tells you it is okay  Sex may increase your risk for a urinary tract infection  Medicines: You may  be given any of the following:  · Antibiotics  help treat or prevent a bacterial infection  · Acetaminophen  decreases pain and fever  It is available without a doctor's order  Ask how much to take and how often to take it  Follow directions  Read the labels of all other medicines you are using to see if they also contain acetaminophen, or ask your doctor or pharmacist  Acetaminophen can cause liver damage if not taken correctly  Do not use more than 4 grams (4,000 milligrams) total of acetaminophen in one day  · Take your medicine as directed  Contact your healthcare provider if you think your medicine is not helping or if you have side effects  Tell him or her if you are allergic to any medicine  Keep a list of the medicines, vitamins, and herbs you take  Include the amounts, and when and why you take them  Bring the list or the pill bottles to follow-up visits  Carry your medicine list with you in case of an emergency  Follow up with your healthcare provider as directed: You may need to have another cystoscopy  Write down your questions so you remember to ask them during your visits  © 2017 [address] Information is for End User's use only and may not be sold, redistributed or otherwise used for commercial purposes  All illustrations and images included in CareNotes® are the copyrighted property of [employer]  or Henrique Miranda  The above information is an  only  It is not intended as medical advice for individual conditions or treatments  Talk to your doctor, nurse or pharmacist before following any medical regimen to see if it is safe and effective for you    Dietary Management of Kidney Stone Disease    The dietary recommendations for most people who make kidney stones (especially the most common calcium oxalate stones) are uncomplicated and are not too tedious or bland  Most importantly, the following recommendations also promote better health for a variety of reasons  Fluids: The single most important change for the majority patients is the need to greatly increase fluid intake  You should at least produce two liters (about two quarts) of urine each day  Depending on the heat outdoors and your level of physical activity, this usually means consuming ten, 10 ounce glasses (100 ounces) of fluid per day  Water is always a good choice, but other drinks including tea, coffee, soda, and juice are also allowed as long as no one beverage becomes the sole source of fluid  CALCIUM:  There is excellent evidence that calcium should not be avoided, but instead moderated  A range of 600 to 1,100 mg of calcium per day, especially consumed at meals is probably a reasonable target  (i e  2-3 dairy servings per day) This might include small servings of yogurt, milk or ice cream   This amount helps avoid over-absorption of oxalate from the digestive tract and also allows for healthy bone maintenance  SODIUM (SALT): Too much salt in your diet (both from the shaker and in the prepared foods that we buy) is bad for your blood pressure, bad for your heart, and also increases the amount of calcium in your urine  A reasonable sodium restriction to 2,000-2,500mg/day (about the amount in one teaspoon) is an excellent target  You should get into the habit of reading the Nutrients labels on all the foods that you eat and watch out for the foods that have a high sodium content (snack foods, smoked or processed foods, caned foods)  Fresh and frozen foods usually have the least amount of sodium      PROTEIN:  High protein diets from animal meat (beef, chicken, pork, fish) also increases the rate of kidney stone formation and is equally unhealthy for your heart  All patients should moderate their meat intake to 3-7 ounces per day, and particularly stay away from red meat protein  OXALATE:  Most stone-formers should avoid heavy intake of oxalate-rich foods  These include green roughage (spinach, mustard, kale), strawberries, chocolate, tea, iced tea, and nuts  In addition, heavy, excess doses of Vitamin C can also produce surges in urinary oxalate levels and should be avoided  BARE-BONES RECOMMENDATIONS:  1  Fluids, fluids, fluids  2  Low salt diet (your primary care doctor will love you)  3  Moderate red meat intake  4  Moderate calcium (dairy products), especially with meals

## 2019-03-22 ENCOUNTER — PROCEDURE VISIT (OUTPATIENT)
Dept: UROLOGY | Facility: AMBULATORY SURGERY CENTER | Age: 59
End: 2019-03-22
Payer: COMMERCIAL

## 2019-03-22 VITALS
BODY MASS INDEX: 22.6 KG/M2 | HEART RATE: 82 BPM | WEIGHT: 140.6 LBS | HEIGHT: 66 IN | DIASTOLIC BLOOD PRESSURE: 80 MMHG | SYSTOLIC BLOOD PRESSURE: 126 MMHG

## 2019-03-22 DIAGNOSIS — N31.9 NEUROGENIC BLADDER: Primary | ICD-10-CM

## 2019-03-22 LAB
SL AMB  POCT GLUCOSE, UA: NORMAL
SL AMB LEUKOCYTE ESTERASE,UA: NORMAL
SL AMB POCT BILIRUBIN,UA: NORMAL
SL AMB POCT BLOOD,UA: NORMAL
SL AMB POCT CLARITY,UA: CLEAR
SL AMB POCT COLOR,UA: YELLOW
SL AMB POCT KETONES,UA: NORMAL
SL AMB POCT NITRITE,UA: NORMAL
SL AMB POCT PH,UA: 7
SL AMB POCT SPECIFIC GRAVITY,UA: 1
SL AMB POCT URINE PROTEIN: NORMAL
SL AMB POCT UROBILINOGEN: 0.2

## 2019-03-22 PROCEDURE — 88112 CYTOPATH CELL ENHANCE TECH: CPT | Performed by: PATHOLOGY

## 2019-03-22 PROCEDURE — 52000 CYSTOURETHROSCOPY: CPT | Performed by: UROLOGY

## 2019-03-22 PROCEDURE — 81002 URINALYSIS NONAUTO W/O SCOPE: CPT | Performed by: UROLOGY

## 2019-03-22 RX ORDER — MULTIVIT-MIN/IRON/FOLIC ACID/K 18-600-40
CAPSULE ORAL
COMMUNITY

## 2019-03-22 RX ORDER — MAG HYDROX/ALUMINUM HYD/SIMETH 400-400-40
SUSPENSION, ORAL (FINAL DOSE FORM) ORAL
COMMUNITY

## 2019-03-22 NOTE — PROGRESS NOTES
Patient with history of neurogenic bladder performing intermittent self catheterization 3 times daily for the last several years  She believes that this is related to her cervical cancer  She is status post radiation and hysterectomy remotely  She complains of recurrent urinary tract infections as well as gross hematuria on 1 occasion  She does have a nonobstructing renal calculus, 7 mm which has been observed  Cystoscopy  Date/Time: 3/22/2019 9:29 AM  Performed by: Prescott Leyden, MD  Authorized by: Prescott Leyden, MD     Additional Procedure Details: Flexible cystoscopy note     The patient was prepped and draped in sterile fashion  2% lidocaine jelly was instilled per urethra   The 16 Serbian flexible cystoscope was placed per urethra  No urethral abnormality  Evaluation of the bladder reveals bilateral normal ureteral orifices  There is diffuse 1+ trabeculation  There is hypervascularity, especially at the trigone, consistent with prior radiation therapy   Yazmin Montalvoes is no evidence of urothelial carcinoma or suspicious lesion   The scope was removed and the procedure was terminated  The patient tolerated the procedure well  I asked her to void after the procedure, filling the bladder to over 300 mL  I then asked her to catheterize herself and she obtained 100 mL postvoid residual     Plan  Continue observation  She should measure residuals periodically and consider increasing frequency of self catheterization if residual greater than 250 mL  Otherwise continue to observe  If infections become to frequent, may consider prophylactic antibiotic, but would prefer to avoid due to concern for side effects and bacterial resistance

## 2019-03-25 ENCOUNTER — TRANSCRIBE ORDERS (OUTPATIENT)
Dept: LAB | Facility: CLINIC | Age: 59
End: 2019-03-25

## 2019-03-25 ENCOUNTER — APPOINTMENT (OUTPATIENT)
Dept: LAB | Facility: CLINIC | Age: 59
End: 2019-03-25
Payer: COMMERCIAL

## 2019-03-25 ENCOUNTER — TELEPHONE (OUTPATIENT)
Dept: UROLOGY | Facility: CLINIC | Age: 59
End: 2019-03-25

## 2019-03-25 DIAGNOSIS — R50.9 FEVER, UNSPECIFIED FEVER CAUSE: Primary | ICD-10-CM

## 2019-03-25 DIAGNOSIS — R50.9 FEVER, UNSPECIFIED FEVER CAUSE: ICD-10-CM

## 2019-03-25 DIAGNOSIS — N39.0 ACUTE UTI: ICD-10-CM

## 2019-03-25 LAB
BACTERIA UR QL AUTO: ABNORMAL /HPF
BILIRUB UR QL STRIP: NEGATIVE
CAOX CRY URNS QL MICRO: ABNORMAL /HPF
CLARITY UR: ABNORMAL
COLOR UR: YELLOW
GLUCOSE UR STRIP-MCNC: NEGATIVE MG/DL
HGB UR QL STRIP.AUTO: ABNORMAL
KETONES UR STRIP-MCNC: NEGATIVE MG/DL
LEUKOCYTE ESTERASE UR QL STRIP: ABNORMAL
NITRITE UR QL STRIP: POSITIVE
NON-SQ EPI CELLS URNS QL MICRO: ABNORMAL /HPF
PH UR STRIP.AUTO: 7 [PH]
PROT UR STRIP-MCNC: NEGATIVE MG/DL
RBC #/AREA URNS AUTO: ABNORMAL /HPF
SP GR UR STRIP.AUTO: 1.02 (ref 1–1.03)
UROBILINOGEN UR QL STRIP.AUTO: 0.2 E.U./DL
WBC #/AREA URNS AUTO: ABNORMAL /HPF

## 2019-03-25 PROCEDURE — 87186 SC STD MICRODIL/AGAR DIL: CPT

## 2019-03-25 PROCEDURE — 81001 URINALYSIS AUTO W/SCOPE: CPT

## 2019-03-25 PROCEDURE — 87077 CULTURE AEROBIC IDENTIFY: CPT

## 2019-03-25 PROCEDURE — 87086 URINE CULTURE/COLONY COUNT: CPT

## 2019-03-25 NOTE — TELEPHONE ENCOUNTER
Regarding: Test Results Question  Contact: [phone]  ----- Message from [address] [place] [address] [address], Generic sent at 3/25/2019  3:15 PM EDT -----    I came for my Cystoscopy on Friday March 22nd and now I developed a fever (again!!) on Sunday and it's continuing today  Avg  is 102  This is ridiculous why I am getting so many fevers  Please respond because if I have to have a Urine Culture I want to do it soon so I don't have to wait 3 days for the results

## 2019-03-25 NOTE — TELEPHONE ENCOUNTER
Recommend patient proceed with obtaining urine testing with UA and culture  Order placed in [address] [place] [address] Rd  Someone from office will call with results  In the interim, patient should be taking otc NSAIDs  If fever remains >101 2, she should contact PCP or proceed to ER for further evaluation

## 2019-03-25 NOTE — TELEPHONE ENCOUNTER
Patient of Dr Abelardo Emanuel, managed at [facility] office  States she started with fever of 102 yesterday  No urinary symptoms (although she had none with last infection either)  Is treating fever with otc nsaids  Would like to know what she should do at this point  Advised I would speak with AP and call patient back

## 2019-03-25 NOTE — TELEPHONE ENCOUNTER
Called patient and advised of above  Patient will go today for urine testing  Patient verbalized understanding and agrees with plan

## 2019-03-27 RX ORDER — NITROFURANTOIN 25; 75 MG/1; MG/1
100 CAPSULE ORAL 2 TIMES DAILY
Qty: 14 CAPSULE | Refills: 0 | Status: SHIPPED | OUTPATIENT
Start: 2019-03-27 | End: 2019-04-03

## 2019-03-27 NOTE — TELEPHONE ENCOUNTER
Please inform patient results of final urine culture shows evidence of infection  Prescription for Macrobid was electronically sent to her [facility] pharmacy

## 2019-03-27 NOTE — TELEPHONE ENCOUNTER
Called patient and informed her of medication that was being sent to pharmacy  Patient expressed that she is not confident that Waseca Arti will cure infection without the infection coming back within a week to then try Macrobid again  Informed patient that if while taking medication she feels like symptoms are not resolving she can call office and we can prescribe another medication to help with symptoms  All questions answered at time of call and patient verbalized understanding of plan and agrees

## 2019-03-28 LAB
BACTERIA UR CULT: ABNORMAL
BACTERIA UR CULT: ABNORMAL

## 2019-04-22 ENCOUNTER — TELEPHONE (OUTPATIENT)
Dept: UROLOGY | Facility: AMBULATORY SURGERY CENTER | Age: 59
End: 2019-04-22

## 2019-04-22 ENCOUNTER — APPOINTMENT (OUTPATIENT)
Dept: LAB | Facility: CLINIC | Age: 59
End: 2019-04-22
Payer: COMMERCIAL

## 2019-04-22 DIAGNOSIS — N39.0 URINARY TRACT INFECTION WITHOUT HEMATURIA, SITE UNSPECIFIED: ICD-10-CM

## 2019-04-22 DIAGNOSIS — N39.0 URINARY TRACT INFECTION WITHOUT HEMATURIA, SITE UNSPECIFIED: Primary | ICD-10-CM

## 2019-04-22 LAB
BACTERIA UR QL AUTO: ABNORMAL /HPF
BILIRUB UR QL STRIP: NEGATIVE
CLARITY UR: CLEAR
COLOR UR: YELLOW
GLUCOSE UR STRIP-MCNC: NEGATIVE MG/DL
HGB UR QL STRIP.AUTO: ABNORMAL
KETONES UR STRIP-MCNC: NEGATIVE MG/DL
LEUKOCYTE ESTERASE UR QL STRIP: ABNORMAL
NITRITE UR QL STRIP: POSITIVE
NON-SQ EPI CELLS URNS QL MICRO: ABNORMAL /HPF
PH UR STRIP.AUTO: 6 [PH]
PROT UR STRIP-MCNC: NEGATIVE MG/DL
RBC #/AREA URNS AUTO: ABNORMAL /HPF
SP GR UR STRIP.AUTO: 1.02 (ref 1–1.03)
UROBILINOGEN UR QL STRIP.AUTO: 0.2 E.U./DL
WBC #/AREA URNS AUTO: ABNORMAL /HPF

## 2019-04-22 PROCEDURE — 87186 SC STD MICRODIL/AGAR DIL: CPT

## 2019-04-22 PROCEDURE — 87077 CULTURE AEROBIC IDENTIFY: CPT

## 2019-04-22 PROCEDURE — 87086 URINE CULTURE/COLONY COUNT: CPT

## 2019-04-22 PROCEDURE — 81001 URINALYSIS AUTO W/SCOPE: CPT

## 2019-04-24 ENCOUNTER — OFFICE VISIT (OUTPATIENT)
Dept: UROLOGY | Facility: AMBULATORY SURGERY CENTER | Age: 59
End: 2019-04-24
Payer: COMMERCIAL

## 2019-04-24 VITALS
HEIGHT: 66 IN | SYSTOLIC BLOOD PRESSURE: 118 MMHG | WEIGHT: 138.6 LBS | BODY MASS INDEX: 22.28 KG/M2 | HEART RATE: 84 BPM | DIASTOLIC BLOOD PRESSURE: 70 MMHG

## 2019-04-24 DIAGNOSIS — N39.0 RECURRENT UTI: Primary | ICD-10-CM

## 2019-04-24 DIAGNOSIS — N39.0 ACUTE UTI: ICD-10-CM

## 2019-04-24 LAB — BACTERIA UR CULT: ABNORMAL

## 2019-04-24 PROCEDURE — 99214 OFFICE O/P EST MOD 30 MIN: CPT | Performed by: UROLOGY

## 2019-04-24 RX ORDER — CEPHALEXIN 500 MG/1
500 CAPSULE ORAL 3 TIMES DAILY
Qty: 21 CAPSULE | Refills: 0 | Status: SHIPPED | OUTPATIENT
Start: 2019-04-24 | End: 2019-05-01

## 2019-05-08 ENCOUNTER — APPOINTMENT (OUTPATIENT)
Dept: LAB | Facility: CLINIC | Age: 59
End: 2019-05-08
Payer: COMMERCIAL

## 2019-05-08 DIAGNOSIS — N39.0 RECURRENT UTI: ICD-10-CM

## 2019-05-08 LAB
ANION GAP SERPL CALCULATED.3IONS-SCNC: 10 MMOL/L (ref 4–13)
BUN SERPL-MCNC: 14 MG/DL (ref 5–25)
CALCIUM SERPL-MCNC: 9.8 MG/DL (ref 8.3–10.1)
CHLORIDE SERPL-SCNC: 104 MMOL/L (ref 100–108)
CO2 SERPL-SCNC: 29 MMOL/L (ref 21–32)
CREAT SERPL-MCNC: 0.89 MG/DL (ref 0.6–1.3)
GFR SERPL CREATININE-BSD FRML MDRD: 71 ML/MIN/1.73SQ M
GLUCOSE P FAST SERPL-MCNC: 94 MG/DL (ref 65–99)
POTASSIUM SERPL-SCNC: 3.4 MMOL/L (ref 3.5–5.3)
SODIUM SERPL-SCNC: 143 MMOL/L (ref 136–145)

## 2019-05-08 PROCEDURE — 80048 BASIC METABOLIC PNL TOTAL CA: CPT

## 2019-05-08 PROCEDURE — 36415 COLL VENOUS BLD VENIPUNCTURE: CPT

## 2019-05-13 ENCOUNTER — HOSPITAL ENCOUNTER (OUTPATIENT)
Dept: CT IMAGING | Facility: HOSPITAL | Age: 59
Discharge: HOME | End: 2019-05-13
Attending: UROLOGY
Payer: COMMERCIAL

## 2019-05-13 DIAGNOSIS — N39.0 RECURRENT UTI: ICD-10-CM

## 2019-05-13 PROCEDURE — 74178 CT ABD&PLV WO CNTR FLWD CNTR: CPT

## 2019-05-13 RX ADMIN — IOHEXOL 100 ML: 350 INJECTION, SOLUTION INTRAVENOUS at 17:15

## 2019-05-20 ENCOUNTER — OFFICE VISIT (OUTPATIENT)
Dept: UROLOGY | Facility: AMBULATORY SURGERY CENTER | Age: 59
End: 2019-05-20
Payer: COMMERCIAL

## 2019-05-20 VITALS
SYSTOLIC BLOOD PRESSURE: 112 MMHG | HEIGHT: 66 IN | BODY MASS INDEX: 22.66 KG/M2 | HEART RATE: 73 BPM | WEIGHT: 141 LBS | DIASTOLIC BLOOD PRESSURE: 70 MMHG

## 2019-05-20 DIAGNOSIS — N31.9 NEUROGENIC BLADDER: Primary | ICD-10-CM

## 2019-05-20 PROCEDURE — 99214 OFFICE O/P EST MOD 30 MIN: CPT | Performed by: UROLOGY

## 2019-06-11 ENCOUNTER — APPOINTMENT (OUTPATIENT)
Dept: LAB | Facility: CLINIC | Age: 59
End: 2019-06-11
Payer: COMMERCIAL

## 2019-06-11 ENCOUNTER — TRANSCRIBE ORDERS (OUTPATIENT)
Dept: LAB | Facility: CLINIC | Age: 59
End: 2019-06-11

## 2019-06-11 DIAGNOSIS — Z17.0 ESTROGEN RECEPTOR POSITIVE: ICD-10-CM

## 2019-06-11 DIAGNOSIS — C50.411 MALIGNANT NEOPLASM OF UPPER-OUTER QUADRANT OF RIGHT FEMALE BREAST, UNSPECIFIED ESTROGEN RECEPTOR STATUS (HCC): ICD-10-CM

## 2019-06-11 DIAGNOSIS — R94.5 NONSPECIFIC ABNORMAL RESULTS OF LIVER FUNCTION STUDY: ICD-10-CM

## 2019-06-11 DIAGNOSIS — I74.4 THROMBOSIS OF ARTERIES OF THE EXTREMITIES (HCC): ICD-10-CM

## 2019-06-11 DIAGNOSIS — N31.9 NEUROGENIC BLADDER: ICD-10-CM

## 2019-06-11 DIAGNOSIS — C50.411 MALIGNANT NEOPLASM OF UPPER-OUTER QUADRANT OF RIGHT FEMALE BREAST, UNSPECIFIED ESTROGEN RECEPTOR STATUS (HCC): Primary | ICD-10-CM

## 2019-06-11 LAB
ALBUMIN SERPL BCP-MCNC: 3.6 G/DL (ref 3.5–5)
ALP SERPL-CCNC: 74 U/L (ref 46–116)
ALT SERPL W P-5'-P-CCNC: 23 U/L (ref 12–78)
ANION GAP SERPL CALCULATED.3IONS-SCNC: 8 MMOL/L (ref 4–13)
AST SERPL W P-5'-P-CCNC: 20 U/L (ref 5–45)
BASOPHILS # BLD AUTO: 0.04 THOUSANDS/ΜL (ref 0–0.1)
BASOPHILS NFR BLD AUTO: 1 % (ref 0–1)
BILIRUB SERPL-MCNC: 0.4 MG/DL (ref 0.2–1)
BUN SERPL-MCNC: 16 MG/DL (ref 5–25)
CALCIUM SERPL-MCNC: 9.2 MG/DL (ref 8.3–10.1)
CHLORIDE SERPL-SCNC: 104 MMOL/L (ref 100–108)
CO2 SERPL-SCNC: 29 MMOL/L (ref 21–32)
CREAT SERPL-MCNC: 0.66 MG/DL (ref 0.6–1.3)
EOSINOPHIL # BLD AUTO: 0.18 THOUSAND/ΜL (ref 0–0.61)
EOSINOPHIL NFR BLD AUTO: 3 % (ref 0–6)
ERYTHROCYTE [DISTWIDTH] IN BLOOD BY AUTOMATED COUNT: 13.6 % (ref 11.6–15.1)
GFR SERPL CREATININE-BSD FRML MDRD: 96 ML/MIN/1.73SQ M
GLUCOSE SERPL-MCNC: 103 MG/DL (ref 65–140)
HCT VFR BLD AUTO: 36.5 % (ref 34.8–46.1)
HGB BLD-MCNC: 11.4 G/DL (ref 11.5–15.4)
IMM GRANULOCYTES # BLD AUTO: 0.02 THOUSAND/UL (ref 0–0.2)
IMM GRANULOCYTES NFR BLD AUTO: 0 % (ref 0–2)
LYMPHOCYTES # BLD AUTO: 1.46 THOUSANDS/ΜL (ref 0.6–4.47)
LYMPHOCYTES NFR BLD AUTO: 27 % (ref 14–44)
MCH RBC QN AUTO: 28.6 PG (ref 26.8–34.3)
MCHC RBC AUTO-ENTMCNC: 31.2 G/DL (ref 31.4–37.4)
MCV RBC AUTO: 92 FL (ref 82–98)
MONOCYTES # BLD AUTO: 0.64 THOUSAND/ΜL (ref 0.17–1.22)
MONOCYTES NFR BLD AUTO: 12 % (ref 4–12)
NEUTROPHILS # BLD AUTO: 2.99 THOUSANDS/ΜL (ref 1.85–7.62)
NEUTS SEG NFR BLD AUTO: 57 % (ref 43–75)
NRBC BLD AUTO-RTO: 0 /100 WBCS
PLATELET # BLD AUTO: 228 THOUSANDS/UL (ref 149–390)
PMV BLD AUTO: 10.6 FL (ref 8.9–12.7)
POTASSIUM SERPL-SCNC: 3.8 MMOL/L (ref 3.5–5.3)
PROT SERPL-MCNC: 7.6 G/DL (ref 6.4–8.2)
RBC # BLD AUTO: 3.99 MILLION/UL (ref 3.81–5.12)
SODIUM SERPL-SCNC: 141 MMOL/L (ref 136–145)
WBC # BLD AUTO: 5.33 THOUSAND/UL (ref 4.31–10.16)

## 2019-06-11 PROCEDURE — 87086 URINE CULTURE/COLONY COUNT: CPT

## 2019-06-11 PROCEDURE — 87186 SC STD MICRODIL/AGAR DIL: CPT

## 2019-06-11 PROCEDURE — 36415 COLL VENOUS BLD VENIPUNCTURE: CPT

## 2019-06-11 PROCEDURE — 87077 CULTURE AEROBIC IDENTIFY: CPT

## 2019-06-11 PROCEDURE — 80053 COMPREHEN METABOLIC PANEL: CPT

## 2019-06-11 PROCEDURE — 85025 COMPLETE CBC W/AUTO DIFF WBC: CPT

## 2019-06-13 LAB — BACTERIA UR CULT: ABNORMAL

## 2019-06-14 ENCOUNTER — TELEPHONE (OUTPATIENT)
Dept: UROLOGY | Facility: AMBULATORY SURGERY CENTER | Age: 59
End: 2019-06-14

## 2019-06-14 ENCOUNTER — TELEPHONE (OUTPATIENT)
Dept: UROLOGY | Facility: MEDICAL CENTER | Age: 59
End: 2019-06-14

## 2019-06-14 DIAGNOSIS — N39.0 URINARY TRACT INFECTION WITHOUT HEMATURIA, SITE UNSPECIFIED: Primary | ICD-10-CM

## 2019-06-14 RX ORDER — NITROFURANTOIN 25; 75 MG/1; MG/1
100 CAPSULE ORAL 2 TIMES DAILY
Qty: 14 CAPSULE | Refills: 0 | Status: SHIPPED | OUTPATIENT
Start: 2019-06-14 | End: 2019-06-21

## 2019-07-19 ENCOUNTER — APPOINTMENT (OUTPATIENT)
Dept: LAB | Facility: CLINIC | Age: 59
End: 2019-07-19
Payer: COMMERCIAL

## 2019-07-19 ENCOUNTER — TRANSCRIBE ORDERS (OUTPATIENT)
Dept: LAB | Facility: CLINIC | Age: 59
End: 2019-07-19

## 2019-07-19 ENCOUNTER — TELEPHONE (OUTPATIENT)
Dept: UROLOGY | Facility: MEDICAL CENTER | Age: 59
End: 2019-07-19

## 2019-07-19 DIAGNOSIS — N31.9 NEUROGENIC DYSFUNCTION OF THE URINARY BLADDER: Primary | ICD-10-CM

## 2019-07-19 DIAGNOSIS — R30.9 PAINFUL URINATION: Primary | ICD-10-CM

## 2019-07-19 DIAGNOSIS — N39.0 URINARY TRACT INFECTION WITHOUT HEMATURIA, SITE UNSPECIFIED: Primary | ICD-10-CM

## 2019-07-19 DIAGNOSIS — N31.9 NEUROGENIC DYSFUNCTION OF THE URINARY BLADDER: ICD-10-CM

## 2019-07-19 PROCEDURE — 87077 CULTURE AEROBIC IDENTIFY: CPT

## 2019-07-19 PROCEDURE — 87086 URINE CULTURE/COLONY COUNT: CPT

## 2019-07-19 PROCEDURE — 87186 SC STD MICRODIL/AGAR DIL: CPT

## 2019-07-19 RX ORDER — CIPROFLOXACIN 500 MG/1
500 TABLET, FILM COATED ORAL EVERY 12 HOURS SCHEDULED
Qty: 20 TABLET | Refills: 0 | Status: SHIPPED | OUTPATIENT
Start: 2019-07-19 | End: 2019-07-29

## 2019-07-19 NOTE — TELEPHONE ENCOUNTER
Patient of Dr Deb Fermin with a history of UTI's states that she is sure that she has a UTI again  I informed her that she needs to go for ua, c/s  She stated that she will do this after work but she needs an antibiotic to get her through the weekend instead of taking Tylenol  I informed her that we like to see preliminary results prior to calling in an antibiotic but that I would pass the message along to the AP  Please advise

## 2019-07-19 NOTE — TELEPHONE ENCOUNTER
Called pt back to advise above  Encouraged pt to do the U/A CNS before starting antibiotics  Pt understood and said she was getting it done today  Please look out for result

## 2019-07-19 NOTE — TELEPHONE ENCOUNTER
Patient of Dr Edwige Sharma seen in the [place] office  Patient called in for a antibiotic for her frequent uti  Patient will be available until 4pm at phone #[phone]  Please advise

## 2019-07-19 NOTE — TELEPHONE ENCOUNTER
Please call to notify patient that I sent a prescription for ciprofloxacin 500 mg p o  B i d  Times 10 days to the pharmacy on file    She needs to get urine testing completed before initiating antibiotic therapy

## 2019-07-21 LAB
BACTERIA UR CULT: ABNORMAL
BACTERIA UR CULT: ABNORMAL

## 2019-07-22 NOTE — TELEPHONE ENCOUNTER
Final results in Epic  Patient aware that she is one the right antibiotic  She stated that she had not started it yet but she will today now that she knows it is the right abx for her infection

## 2020-06-24 ENCOUNTER — APPOINTMENT (OUTPATIENT)
Dept: LAB | Facility: CLINIC | Age: 60
End: 2020-06-24
Payer: COMMERCIAL

## 2020-06-24 ENCOUNTER — TRANSCRIBE ORDERS (OUTPATIENT)
Dept: LAB | Facility: CLINIC | Age: 60
End: 2020-06-24

## 2020-06-24 DIAGNOSIS — C50.411 MALIGNANT NEOPLASM OF UPPER-OUTER QUADRANT OF RIGHT BREAST IN FEMALE, ESTROGEN RECEPTOR POSITIVE (HCC): Primary | ICD-10-CM

## 2020-06-24 DIAGNOSIS — E78.2 MIXED HYPERLIPIDEMIA: ICD-10-CM

## 2020-06-24 DIAGNOSIS — Z17.0 MALIGNANT NEOPLASM OF UPPER-OUTER QUADRANT OF RIGHT BREAST IN FEMALE, ESTROGEN RECEPTOR POSITIVE (HCC): Primary | ICD-10-CM

## 2020-06-24 DIAGNOSIS — E78.2 MIXED HYPERLIPIDEMIA: Primary | ICD-10-CM

## 2020-06-24 DIAGNOSIS — C50.411 MALIGNANT NEOPLASM OF UPPER-OUTER QUADRANT OF RIGHT BREAST IN FEMALE, ESTROGEN RECEPTOR POSITIVE (HCC): ICD-10-CM

## 2020-06-24 DIAGNOSIS — Z17.0 MALIGNANT NEOPLASM OF UPPER-OUTER QUADRANT OF RIGHT BREAST IN FEMALE, ESTROGEN RECEPTOR POSITIVE (HCC): ICD-10-CM

## 2020-06-24 LAB
ALBUMIN SERPL BCP-MCNC: 4 G/DL (ref 3.5–5)
ALP SERPL-CCNC: 69 U/L (ref 46–116)
ALT SERPL W P-5'-P-CCNC: 24 U/L (ref 12–78)
ANION GAP SERPL CALCULATED.3IONS-SCNC: 5 MMOL/L (ref 4–13)
AST SERPL W P-5'-P-CCNC: 24 U/L (ref 5–45)
BASOPHILS # BLD AUTO: 0.05 THOUSANDS/ΜL (ref 0–0.1)
BASOPHILS NFR BLD AUTO: 1 % (ref 0–1)
BILIRUB SERPL-MCNC: 0.83 MG/DL (ref 0.2–1)
BUN SERPL-MCNC: 19 MG/DL (ref 5–25)
CALCIUM SERPL-MCNC: 9.4 MG/DL (ref 8.3–10.1)
CHLORIDE SERPL-SCNC: 103 MMOL/L (ref 100–108)
CHOLEST SERPL-MCNC: 190 MG/DL (ref 50–200)
CO2 SERPL-SCNC: 30 MMOL/L (ref 21–32)
CREAT SERPL-MCNC: 0.9 MG/DL (ref 0.6–1.3)
EOSINOPHIL # BLD AUTO: 0.15 THOUSAND/ΜL (ref 0–0.61)
EOSINOPHIL NFR BLD AUTO: 2 % (ref 0–6)
ERYTHROCYTE [DISTWIDTH] IN BLOOD BY AUTOMATED COUNT: 12.4 % (ref 11.6–15.1)
GFR SERPL CREATININE-BSD FRML MDRD: 69 ML/MIN/1.73SQ M
GLUCOSE P FAST SERPL-MCNC: 109 MG/DL (ref 65–99)
HCT VFR BLD AUTO: 40.2 % (ref 34.8–46.1)
HDLC SERPL-MCNC: 62 MG/DL
HGB BLD-MCNC: 12.8 G/DL (ref 11.5–15.4)
IMM GRANULOCYTES # BLD AUTO: 0.02 THOUSAND/UL (ref 0–0.2)
IMM GRANULOCYTES NFR BLD AUTO: 0 % (ref 0–2)
LDLC SERPL CALC-MCNC: 94 MG/DL (ref 0–100)
LYMPHOCYTES # BLD AUTO: 1.58 THOUSANDS/ΜL (ref 0.6–4.47)
LYMPHOCYTES NFR BLD AUTO: 24 % (ref 14–44)
MCH RBC QN AUTO: 29 PG (ref 26.8–34.3)
MCHC RBC AUTO-ENTMCNC: 31.8 G/DL (ref 31.4–37.4)
MCV RBC AUTO: 91 FL (ref 82–98)
MONOCYTES # BLD AUTO: 0.69 THOUSAND/ΜL (ref 0.17–1.22)
MONOCYTES NFR BLD AUTO: 11 % (ref 4–12)
NEUTROPHILS # BLD AUTO: 4.09 THOUSANDS/ΜL (ref 1.85–7.62)
NEUTS SEG NFR BLD AUTO: 62 % (ref 43–75)
NONHDLC SERPL-MCNC: 128 MG/DL
NRBC BLD AUTO-RTO: 0 /100 WBCS
PLATELET # BLD AUTO: 257 THOUSANDS/UL (ref 149–390)
PMV BLD AUTO: 10.9 FL (ref 8.9–12.7)
POTASSIUM SERPL-SCNC: 3.7 MMOL/L (ref 3.5–5.3)
PROT SERPL-MCNC: 7.5 G/DL (ref 6.4–8.2)
RBC # BLD AUTO: 4.41 MILLION/UL (ref 3.81–5.12)
SODIUM SERPL-SCNC: 138 MMOL/L (ref 136–145)
TRIGL SERPL-MCNC: 172 MG/DL
TSH SERPL DL<=0.05 MIU/L-ACNC: 4.11 UIU/ML (ref 0.36–3.74)
WBC # BLD AUTO: 6.58 THOUSAND/UL (ref 4.31–10.16)

## 2020-06-24 PROCEDURE — 80053 COMPREHEN METABOLIC PANEL: CPT

## 2020-06-24 PROCEDURE — 80061 LIPID PANEL: CPT

## 2020-06-24 PROCEDURE — 84443 ASSAY THYROID STIM HORMONE: CPT

## 2020-06-24 PROCEDURE — 85025 COMPLETE CBC W/AUTO DIFF WBC: CPT

## 2020-06-24 PROCEDURE — 36415 COLL VENOUS BLD VENIPUNCTURE: CPT

## 2020-08-13 DIAGNOSIS — E78.2 MIXED HYPERLIPIDEMIA: Primary | ICD-10-CM

## 2020-08-14 RX ORDER — ATORVASTATIN CALCIUM 20 MG/1
20 TABLET, FILM COATED ORAL
Qty: 90 TABLET | Refills: 3 | Status: SHIPPED | OUTPATIENT
Start: 2020-08-14 | End: 2021-08-02

## 2020-11-05 ENCOUNTER — TELEPHONE (OUTPATIENT)
Dept: FAMILY MEDICINE CLINIC | Facility: CLINIC | Age: 60
End: 2020-11-05

## 2020-11-09 ENCOUNTER — OFFICE VISIT (OUTPATIENT)
Dept: FAMILY MEDICINE CLINIC | Facility: CLINIC | Age: 60
End: 2020-11-09
Payer: COMMERCIAL

## 2020-11-09 VITALS
HEART RATE: 83 BPM | SYSTOLIC BLOOD PRESSURE: 130 MMHG | DIASTOLIC BLOOD PRESSURE: 70 MMHG | OXYGEN SATURATION: 98 % | HEIGHT: 66 IN | RESPIRATION RATE: 16 BRPM | BODY MASS INDEX: 22.98 KG/M2 | WEIGHT: 143 LBS

## 2020-11-09 DIAGNOSIS — Z12.11 SCREENING FOR COLORECTAL CANCER: ICD-10-CM

## 2020-11-09 DIAGNOSIS — Z12.12 SCREENING FOR COLORECTAL CANCER: ICD-10-CM

## 2020-11-09 DIAGNOSIS — Z00.00 ANNUAL PHYSICAL EXAM: Primary | ICD-10-CM

## 2020-11-09 PROCEDURE — 3725F SCREEN DEPRESSION PERFORMED: CPT | Performed by: INTERNAL MEDICINE

## 2020-11-09 PROCEDURE — 1036F TOBACCO NON-USER: CPT | Performed by: INTERNAL MEDICINE

## 2020-11-09 PROCEDURE — 99396 PREV VISIT EST AGE 40-64: CPT | Performed by: INTERNAL MEDICINE

## 2020-11-09 PROCEDURE — 3008F BODY MASS INDEX DOCD: CPT | Performed by: INTERNAL MEDICINE

## 2020-11-27 ENCOUNTER — TELEPHONE (OUTPATIENT)
Dept: ADMINISTRATIVE | Facility: OTHER | Age: 60
End: 2020-11-27

## 2021-01-12 ENCOUNTER — TRANSCRIBE ORDERS (OUTPATIENT)
Dept: LAB | Facility: CLINIC | Age: 61
End: 2021-01-12

## 2021-01-12 ENCOUNTER — APPOINTMENT (OUTPATIENT)
Dept: LAB | Facility: CLINIC | Age: 61
End: 2021-01-12
Payer: COMMERCIAL

## 2021-01-12 DIAGNOSIS — C50.411 MALIGNANT NEOPLASM OF UPPER-OUTER QUADRANT OF RIGHT BREAST IN FEMALE, ESTROGEN RECEPTOR POSITIVE (HCC): ICD-10-CM

## 2021-01-12 DIAGNOSIS — Z17.0 MALIGNANT NEOPLASM OF UPPER-OUTER QUADRANT OF RIGHT BREAST IN FEMALE, ESTROGEN RECEPTOR POSITIVE (HCC): Primary | ICD-10-CM

## 2021-01-12 DIAGNOSIS — C50.411 MALIGNANT NEOPLASM OF UPPER-OUTER QUADRANT OF RIGHT BREAST IN FEMALE, ESTROGEN RECEPTOR POSITIVE (HCC): Primary | ICD-10-CM

## 2021-01-12 DIAGNOSIS — Z17.0 ESTROGEN RECEPTOR POSITIVE: ICD-10-CM

## 2021-01-12 DIAGNOSIS — Z17.0 MALIGNANT NEOPLASM OF UPPER-OUTER QUADRANT OF RIGHT BREAST IN FEMALE, ESTROGEN RECEPTOR POSITIVE (HCC): ICD-10-CM

## 2021-01-12 LAB
ALBUMIN SERPL BCP-MCNC: 4.1 G/DL (ref 3.5–5)
ALP SERPL-CCNC: 62 U/L (ref 46–116)
ALT SERPL W P-5'-P-CCNC: 27 U/L (ref 12–78)
ANION GAP SERPL CALCULATED.3IONS-SCNC: 5 MMOL/L (ref 4–13)
AST SERPL W P-5'-P-CCNC: 26 U/L (ref 5–45)
BASOPHILS # BLD AUTO: 0.07 THOUSANDS/ΜL (ref 0–0.1)
BASOPHILS NFR BLD AUTO: 1 % (ref 0–1)
BILIRUB SERPL-MCNC: 0.77 MG/DL (ref 0.2–1)
BUN SERPL-MCNC: 16 MG/DL (ref 5–25)
CALCIUM SERPL-MCNC: 9.1 MG/DL (ref 8.3–10.1)
CHLORIDE SERPL-SCNC: 105 MMOL/L (ref 100–108)
CO2 SERPL-SCNC: 30 MMOL/L (ref 21–32)
CREAT SERPL-MCNC: 0.81 MG/DL (ref 0.6–1.3)
EOSINOPHIL # BLD AUTO: 0.15 THOUSAND/ΜL (ref 0–0.61)
EOSINOPHIL NFR BLD AUTO: 2 % (ref 0–6)
ERYTHROCYTE [DISTWIDTH] IN BLOOD BY AUTOMATED COUNT: 13 % (ref 11.6–15.1)
GFR SERPL CREATININE-BSD FRML MDRD: 79 ML/MIN/1.73SQ M
GLUCOSE P FAST SERPL-MCNC: 118 MG/DL (ref 65–99)
HCT VFR BLD AUTO: 41.3 % (ref 34.8–46.1)
HGB BLD-MCNC: 13.1 G/DL (ref 11.5–15.4)
IMM GRANULOCYTES # BLD AUTO: 0.04 THOUSAND/UL (ref 0–0.2)
IMM GRANULOCYTES NFR BLD AUTO: 1 % (ref 0–2)
LYMPHOCYTES # BLD AUTO: 1.45 THOUSANDS/ΜL (ref 0.6–4.47)
LYMPHOCYTES NFR BLD AUTO: 21 % (ref 14–44)
MCH RBC QN AUTO: 29.4 PG (ref 26.8–34.3)
MCHC RBC AUTO-ENTMCNC: 31.7 G/DL (ref 31.4–37.4)
MCV RBC AUTO: 93 FL (ref 82–98)
MONOCYTES # BLD AUTO: 0.69 THOUSAND/ΜL (ref 0.17–1.22)
MONOCYTES NFR BLD AUTO: 10 % (ref 4–12)
NEUTROPHILS # BLD AUTO: 4.48 THOUSANDS/ΜL (ref 1.85–7.62)
NEUTS SEG NFR BLD AUTO: 65 % (ref 43–75)
NRBC BLD AUTO-RTO: 0 /100 WBCS
PLATELET # BLD AUTO: 230 THOUSANDS/UL (ref 149–390)
PMV BLD AUTO: 10.3 FL (ref 8.9–12.7)
POTASSIUM SERPL-SCNC: 4 MMOL/L (ref 3.5–5.3)
PROT SERPL-MCNC: 7.1 G/DL (ref 6.4–8.2)
RBC # BLD AUTO: 4.46 MILLION/UL (ref 3.81–5.12)
SODIUM SERPL-SCNC: 140 MMOL/L (ref 136–145)
WBC # BLD AUTO: 6.88 THOUSAND/UL (ref 4.31–10.16)

## 2021-01-12 PROCEDURE — 36415 COLL VENOUS BLD VENIPUNCTURE: CPT

## 2021-01-12 PROCEDURE — 80053 COMPREHEN METABOLIC PANEL: CPT

## 2021-01-12 PROCEDURE — 85025 COMPLETE CBC W/AUTO DIFF WBC: CPT

## 2021-01-20 ENCOUNTER — TELEPHONE (OUTPATIENT)
Dept: FAMILY MEDICINE CLINIC | Facility: CLINIC | Age: 61
End: 2021-01-20

## 2021-01-20 NOTE — TELEPHONE ENCOUNTER
Pt called and left msg to schedule an appt in Nov 2021 ,, I called her back left msg to call office back to schedule an appt

## 2021-04-01 DIAGNOSIS — Z23 ENCOUNTER FOR IMMUNIZATION: ICD-10-CM

## 2021-06-29 ENCOUNTER — APPOINTMENT (OUTPATIENT)
Dept: LAB | Facility: CLINIC | Age: 61
End: 2021-06-29
Payer: COMMERCIAL

## 2021-06-29 DIAGNOSIS — Z17.0 ESTROGEN RECEPTOR POSITIVE: ICD-10-CM

## 2021-06-29 DIAGNOSIS — C50.411 MALIGNANT NEOPLASM OF UPPER-OUTER QUADRANT OF RIGHT FEMALE BREAST, UNSPECIFIED ESTROGEN RECEPTOR STATUS (HCC): ICD-10-CM

## 2021-06-29 LAB
ALBUMIN SERPL BCP-MCNC: 4.1 G/DL (ref 3.5–5)
ALP SERPL-CCNC: 65 U/L (ref 46–116)
ALT SERPL W P-5'-P-CCNC: 22 U/L (ref 12–78)
ANION GAP SERPL CALCULATED.3IONS-SCNC: 11 MMOL/L (ref 4–13)
AST SERPL W P-5'-P-CCNC: 20 U/L (ref 5–45)
BASOPHILS # BLD AUTO: 0.05 THOUSANDS/ΜL (ref 0–0.1)
BASOPHILS NFR BLD AUTO: 1 % (ref 0–1)
BILIRUB SERPL-MCNC: 0.97 MG/DL (ref 0.2–1)
BUN SERPL-MCNC: 17 MG/DL (ref 5–25)
CALCIUM SERPL-MCNC: 9.7 MG/DL (ref 8.3–10.1)
CHLORIDE SERPL-SCNC: 103 MMOL/L (ref 100–108)
CO2 SERPL-SCNC: 27 MMOL/L (ref 21–32)
CREAT SERPL-MCNC: 0.81 MG/DL (ref 0.6–1.3)
EOSINOPHIL # BLD AUTO: 0.14 THOUSAND/ΜL (ref 0–0.61)
EOSINOPHIL NFR BLD AUTO: 2 % (ref 0–6)
ERYTHROCYTE [DISTWIDTH] IN BLOOD BY AUTOMATED COUNT: 12.9 % (ref 11.6–15.1)
GFR SERPL CREATININE-BSD FRML MDRD: 78 ML/MIN/1.73SQ M
GLUCOSE P FAST SERPL-MCNC: 109 MG/DL (ref 65–99)
HCT VFR BLD AUTO: 41.1 % (ref 34.8–46.1)
HGB BLD-MCNC: 13 G/DL (ref 11.5–15.4)
IMM GRANULOCYTES # BLD AUTO: 0.03 THOUSAND/UL (ref 0–0.2)
IMM GRANULOCYTES NFR BLD AUTO: 0 % (ref 0–2)
LYMPHOCYTES # BLD AUTO: 1.47 THOUSANDS/ΜL (ref 0.6–4.47)
LYMPHOCYTES NFR BLD AUTO: 18 % (ref 14–44)
MCH RBC QN AUTO: 29.8 PG (ref 26.8–34.3)
MCHC RBC AUTO-ENTMCNC: 31.6 G/DL (ref 31.4–37.4)
MCV RBC AUTO: 94 FL (ref 82–98)
MONOCYTES # BLD AUTO: 0.72 THOUSAND/ΜL (ref 0.17–1.22)
MONOCYTES NFR BLD AUTO: 9 % (ref 4–12)
NEUTROPHILS # BLD AUTO: 5.86 THOUSANDS/ΜL (ref 1.85–7.62)
NEUTS SEG NFR BLD AUTO: 70 % (ref 43–75)
NRBC BLD AUTO-RTO: 0 /100 WBCS
PLATELET # BLD AUTO: 242 THOUSANDS/UL (ref 149–390)
PMV BLD AUTO: 10.9 FL (ref 8.9–12.7)
POTASSIUM SERPL-SCNC: 3.8 MMOL/L (ref 3.5–5.3)
PROT SERPL-MCNC: 7.5 G/DL (ref 6.4–8.2)
RBC # BLD AUTO: 4.36 MILLION/UL (ref 3.81–5.12)
SODIUM SERPL-SCNC: 141 MMOL/L (ref 136–145)
WBC # BLD AUTO: 8.27 THOUSAND/UL (ref 4.31–10.16)

## 2021-06-29 PROCEDURE — 36415 COLL VENOUS BLD VENIPUNCTURE: CPT

## 2021-06-29 PROCEDURE — 85025 COMPLETE CBC W/AUTO DIFF WBC: CPT

## 2021-06-29 PROCEDURE — 80053 COMPREHEN METABOLIC PANEL: CPT

## 2021-07-31 DIAGNOSIS — E78.2 MIXED HYPERLIPIDEMIA: ICD-10-CM

## 2021-08-02 RX ORDER — ATORVASTATIN CALCIUM 20 MG/1
TABLET, FILM COATED ORAL
Qty: 90 TABLET | Refills: 3 | Status: SHIPPED | OUTPATIENT
Start: 2021-08-02 | End: 2022-08-05

## 2021-11-04 ENCOUNTER — APPOINTMENT (OUTPATIENT)
Dept: LAB | Facility: CLINIC | Age: 61
End: 2021-11-04
Payer: COMMERCIAL

## 2021-11-04 DIAGNOSIS — Z17.0 MALIGNANT NEOPLASM OF UPPER-OUTER QUADRANT OF RIGHT BREAST IN FEMALE, ESTROGEN RECEPTOR POSITIVE (HCC): ICD-10-CM

## 2021-11-04 DIAGNOSIS — Z17.0 ESTROGEN RECEPTOR POSITIVE: ICD-10-CM

## 2021-11-04 DIAGNOSIS — C50.411 MALIGNANT NEOPLASM OF UPPER-OUTER QUADRANT OF RIGHT BREAST IN FEMALE, ESTROGEN RECEPTOR POSITIVE (HCC): ICD-10-CM

## 2021-11-04 LAB
ALBUMIN SERPL BCP-MCNC: 4 G/DL (ref 3.5–5)
ALP SERPL-CCNC: 82 U/L (ref 46–116)
ALT SERPL W P-5'-P-CCNC: 31 U/L (ref 12–78)
ANION GAP SERPL CALCULATED.3IONS-SCNC: 9 MMOL/L (ref 4–13)
AST SERPL W P-5'-P-CCNC: 23 U/L (ref 5–45)
BASOPHILS # BLD AUTO: 0.05 THOUSANDS/ΜL (ref 0–0.1)
BASOPHILS NFR BLD AUTO: 1 % (ref 0–1)
BILIRUB SERPL-MCNC: 0.59 MG/DL (ref 0.2–1)
BUN SERPL-MCNC: 17 MG/DL (ref 5–25)
CALCIUM SERPL-MCNC: 9.1 MG/DL (ref 8.3–10.1)
CHLORIDE SERPL-SCNC: 102 MMOL/L (ref 100–108)
CO2 SERPL-SCNC: 29 MMOL/L (ref 21–32)
CREAT SERPL-MCNC: 0.92 MG/DL (ref 0.6–1.3)
EOSINOPHIL # BLD AUTO: 0.18 THOUSAND/ΜL (ref 0–0.61)
EOSINOPHIL NFR BLD AUTO: 3 % (ref 0–6)
ERYTHROCYTE [DISTWIDTH] IN BLOOD BY AUTOMATED COUNT: 12.6 % (ref 11.6–15.1)
GFR SERPL CREATININE-BSD FRML MDRD: 67 ML/MIN/1.73SQ M
GLUCOSE P FAST SERPL-MCNC: 115 MG/DL (ref 65–99)
HCT VFR BLD AUTO: 41.8 % (ref 34.8–46.1)
HGB BLD-MCNC: 12.9 G/DL (ref 11.5–15.4)
IMM GRANULOCYTES # BLD AUTO: 0.05 THOUSAND/UL (ref 0–0.2)
IMM GRANULOCYTES NFR BLD AUTO: 1 % (ref 0–2)
LYMPHOCYTES # BLD AUTO: 1.67 THOUSANDS/ΜL (ref 0.6–4.47)
LYMPHOCYTES NFR BLD AUTO: 25 % (ref 14–44)
MCH RBC QN AUTO: 28.6 PG (ref 26.8–34.3)
MCHC RBC AUTO-ENTMCNC: 30.9 G/DL (ref 31.4–37.4)
MCV RBC AUTO: 93 FL (ref 82–98)
MONOCYTES # BLD AUTO: 0.67 THOUSAND/ΜL (ref 0.17–1.22)
MONOCYTES NFR BLD AUTO: 10 % (ref 4–12)
NEUTROPHILS # BLD AUTO: 4.02 THOUSANDS/ΜL (ref 1.85–7.62)
NEUTS SEG NFR BLD AUTO: 60 % (ref 43–75)
NRBC BLD AUTO-RTO: 0 /100 WBCS
PLATELET # BLD AUTO: 286 THOUSANDS/UL (ref 149–390)
PMV BLD AUTO: 10 FL (ref 8.9–12.7)
POTASSIUM SERPL-SCNC: 4.1 MMOL/L (ref 3.5–5.3)
PROT SERPL-MCNC: 7.6 G/DL (ref 6.4–8.2)
RBC # BLD AUTO: 4.51 MILLION/UL (ref 3.81–5.12)
SODIUM SERPL-SCNC: 140 MMOL/L (ref 136–145)
WBC # BLD AUTO: 6.64 THOUSAND/UL (ref 4.31–10.16)

## 2021-11-04 PROCEDURE — 85025 COMPLETE CBC W/AUTO DIFF WBC: CPT

## 2021-11-04 PROCEDURE — 36415 COLL VENOUS BLD VENIPUNCTURE: CPT

## 2021-11-04 PROCEDURE — 80053 COMPREHEN METABOLIC PANEL: CPT

## 2021-11-12 ENCOUNTER — OFFICE VISIT (OUTPATIENT)
Dept: FAMILY MEDICINE CLINIC | Facility: CLINIC | Age: 61
End: 2021-11-12
Payer: COMMERCIAL

## 2021-11-12 VITALS
SYSTOLIC BLOOD PRESSURE: 122 MMHG | HEART RATE: 107 BPM | DIASTOLIC BLOOD PRESSURE: 80 MMHG | TEMPERATURE: 97.7 F | WEIGHT: 138.8 LBS | RESPIRATION RATE: 16 BRPM | HEIGHT: 66 IN | OXYGEN SATURATION: 98 % | BODY MASS INDEX: 22.31 KG/M2

## 2021-11-12 DIAGNOSIS — C50.411 MALIGNANT NEOPLASM OF UPPER-OUTER QUADRANT OF RIGHT BREAST IN FEMALE, ESTROGEN RECEPTOR POSITIVE (HCC): ICD-10-CM

## 2021-11-12 DIAGNOSIS — N18.2 CKD (CHRONIC KIDNEY DISEASE) STAGE 2, GFR 60-89 ML/MIN: ICD-10-CM

## 2021-11-12 DIAGNOSIS — E78.00 PURE HYPERCHOLESTEROLEMIA: ICD-10-CM

## 2021-11-12 DIAGNOSIS — Z17.0 MALIGNANT NEOPLASM OF UPPER-OUTER QUADRANT OF RIGHT BREAST IN FEMALE, ESTROGEN RECEPTOR POSITIVE (HCC): ICD-10-CM

## 2021-11-12 DIAGNOSIS — Z00.00 ANNUAL PHYSICAL EXAM: Primary | ICD-10-CM

## 2021-11-12 PROCEDURE — 1036F TOBACCO NON-USER: CPT | Performed by: INTERNAL MEDICINE

## 2021-11-12 PROCEDURE — 3008F BODY MASS INDEX DOCD: CPT | Performed by: INTERNAL MEDICINE

## 2021-11-12 PROCEDURE — 99396 PREV VISIT EST AGE 40-64: CPT | Performed by: INTERNAL MEDICINE

## 2021-11-12 PROCEDURE — 3725F SCREEN DEPRESSION PERFORMED: CPT | Performed by: INTERNAL MEDICINE

## 2022-02-28 ENCOUNTER — OFFICE VISIT (OUTPATIENT)
Dept: FAMILY MEDICINE CLINIC | Facility: CLINIC | Age: 62
End: 2022-02-28
Payer: COMMERCIAL

## 2022-02-28 VITALS
OXYGEN SATURATION: 98 % | HEART RATE: 88 BPM | DIASTOLIC BLOOD PRESSURE: 70 MMHG | RESPIRATION RATE: 16 BRPM | BODY MASS INDEX: 22.95 KG/M2 | WEIGHT: 142.8 LBS | SYSTOLIC BLOOD PRESSURE: 120 MMHG | TEMPERATURE: 97 F | HEIGHT: 66 IN

## 2022-02-28 DIAGNOSIS — M67.449 GANGLION CYST OF JOINT OF FINGER: Primary | ICD-10-CM

## 2022-02-28 DIAGNOSIS — Z12.4 SCREENING FOR CERVICAL CANCER: ICD-10-CM

## 2022-02-28 DIAGNOSIS — Z13.820 SCREENING FOR OSTEOPOROSIS: ICD-10-CM

## 2022-02-28 PROCEDURE — 1036F TOBACCO NON-USER: CPT | Performed by: INTERNAL MEDICINE

## 2022-02-28 PROCEDURE — 3008F BODY MASS INDEX DOCD: CPT | Performed by: INTERNAL MEDICINE

## 2022-02-28 PROCEDURE — 99213 OFFICE O/P EST LOW 20 MIN: CPT | Performed by: INTERNAL MEDICINE

## 2022-02-28 NOTE — PROGRESS NOTES
Assessment/Plan:         Problem List Items Addressed This Visit        Musculoskeletal and Integument    Ganglion cyst of joint of finger - Primary     Right index finger- counseling given on benign nature of the problem  Refer patient to hand surgeon  Relevant Orders    Ambulatory Referral to Hand Surgery      Other Visit Diagnoses     Screening for osteoporosis        Screening for cervical cancer                Subjective:      Patient ID: Juan Jose Lamb is a 57 y o  female  1  Lump on her index finger  She noticed it a few days ago  No pain  No injury  No redness  No discharge  No stiffness in finger joints in the morning when she wakes up  She does lot of typing work  She has another small lump on the ring finger  No other complaint  No chest pain or dyspnea  No rash      The following portions of the patient's history were reviewed and updated as appropriate:   Past Medical History:  She has a past medical history of Breast cancer ([facility] ), Cervical cancer ([facility] ), Hyperlipidemia, and Urinary tract infection  ,  _______________________________________________________________________  Medical Problems:  does not have any pertinent problems on file ,  _______________________________________________________________________  Past Surgical History:   has a past surgical history that includes Partial hysterectomy; Breast lumpectomy (Right, 2017); Breast lumpectomy; Cystoscopy (2019); Hysterectomy;  section; Breast lumpectomy; and Colonoscopy (2019)  ,  _______________________________________________________________________  Family History:  family history includes Breast cancer in her maternal aunt; Cancer in her maternal aunt; Lung cancer in her mother ,  _______________________________________________________________________  Social History:   reports that she quit smoking about 17 years ago  Her smoking use included cigarettes  She started smoking about 40 years ago  She has a 11 50 pack-year smoking history  She has never used smokeless tobacco  She reports previous alcohol use of about 3 0 standard drinks of alcohol per week  She reports that she does not use drugs  ,  _______________________________________________________________________  Allergies:  has No Known Allergies     _______________________________________________________________________  Current Outpatient Medications   Medication Sig Dispense Refill    Ascorbic Acid (VITAMIN C) 500 MG CAPS Take by mouth      aspirin 81 MG tablet Take 81 mg by mouth      atorvastatin (LIPITOR) 20 mg tablet TAKE 1 TABLET BY MOUTH DAILY AT BEDTIME 90 tablet 3    Calcium Ascorbate 500 MG TABS Take 500 mg by mouth      Cholecalciferol (VITAMIN D3) 5000 units CAPS Take by mouth      Cranberry 1000 MG CAPS Take 4,200 mg by mouth      letrozole (FEMARA) 2 5 mg tablet Take 2 5 mg by mouth daily      MEGARED OMEGA-3 KRILL OIL PO Take by mouth      Vitamin E 400 units TABS Take 400 Units by mouth      Calcium Carb-Cholecalciferol (CALCIUM CARBONATE-VITAMIN D3 PO) Take by mouth       No current facility-administered medications for this visit      _______________________________________________________________________  Review of Systems      Objective:  Vitals:    02/28/22 1424   BP: 120/70   BP Location: Left arm   Patient Position: Sitting   Cuff Size: Large   Pulse: 88   Resp: 16   Temp: (!) 97 °F (36 1 °C)   TempSrc: Temporal   SpO2: 98%   Weight: 64 8 kg (142 lb 12 8 oz)   Height: 5' 6" (1 676 m)     Body mass index is 23 05 kg/m²  Physical Exam  Constitutional:       General: She is not in acute distress  Appearance: Normal appearance  She is not ill-appearing  Cardiovascular:      Rate and Rhythm: Regular rhythm  Heart sounds: Normal heart sounds  Pulmonary:      Effort: Pulmonary effort is normal       Breath sounds: Normal breath sounds     Skin:     Comments: Small mobile smooth nontender cystic lump on dorsum of her right index finger around proximal interphalangeal joint  No joint swelling  No warmth  No redness  Neurological:      Mental Status: She is alert and oriented to person, place, and time

## 2022-03-03 ENCOUNTER — OFFICE VISIT (OUTPATIENT)
Dept: OBGYN CLINIC | Facility: CLINIC | Age: 62
End: 2022-03-03
Payer: COMMERCIAL

## 2022-03-03 ENCOUNTER — HOSPITAL ENCOUNTER (OUTPATIENT)
Dept: RADIOLOGY | Facility: HOSPITAL | Age: 62
Discharge: HOME | End: 2022-03-03
Payer: COMMERCIAL

## 2022-03-03 VITALS — WEIGHT: 142.8 LBS | HEART RATE: 90 BPM | BODY MASS INDEX: 22.95 KG/M2 | OXYGEN SATURATION: 98 % | HEIGHT: 66 IN

## 2022-03-03 DIAGNOSIS — M67.449 GANGLION CYST OF JOINT OF FINGER: ICD-10-CM

## 2022-03-03 DIAGNOSIS — M67.449 GANGLION CYST OF FINGER: Primary | ICD-10-CM

## 2022-03-03 DIAGNOSIS — M67.449 GANGLION CYST OF FINGER: ICD-10-CM

## 2022-03-03 PROCEDURE — 99203 OFFICE O/P NEW LOW 30 MIN: CPT | Performed by: PHYSICIAN ASSISTANT

## 2022-03-03 PROCEDURE — 73140 X-RAY EXAM OF FINGER(S): CPT

## 2022-03-03 NOTE — PROGRESS NOTES
ASSESSMENT/PLAN:    Assessment:   Dorsal ganglion cyst  Right index PIP    Plan:   Attempted aspiration - no fluid aspirated    Follow Up:  wt Dr Bailey Collado    To Do Next Visit:       General Discussions:     Ganglion Cysts: The anatomy and physiology of the ganglion was discussed with the patient today in the office  Fluid leaking out of the joint surface typically creates a small sac, which can enlarge and cause pain or limitation of motion  Treatment options include observation, aspiration, or surgical incision were discussed with the patient today  Observation typically lead to resolution and approximately 10% of patients, aspiration least resolution approximately 50% of patients, and surgical excision lead to resolution in approximately 97% of patients  After discussion with the patient today, the patient voiced understanding of all treatment options  Operative Discussions:           _____________________________________________________  CHIEF COMPLAINT:  No chief complaint on file  SUBJECTIVE:  Patricio Curiel is a 57 y o  female who presents with a cyst to the right dorsal index finger PIP  This started  6 week(s) ago as Insidious      Radiation: None  Previous Treatments: None without relief  Associated symptoms: None      PAST MEDICAL HISTORY:  Past Medical History:   Diagnosis Date    Breast cancer ([facility] )     Stage 1    Cervical cancer ([address] )     Hyperlipidemia     Urinary tract infection        PAST SURGICAL HISTORY:  Past Surgical History:   Procedure Laterality Date    BREAST LUMPECTOMY Right 2017    BREAST LUMPECTOMY      BREAST LUMPECTOMY      malignant- s/p radiation     SECTION      COLONOSCOPY  2019    CYSTOSCOPY  2019    HYSTERECTOMY      PARTIAL HYSTERECTOMY         FAMILY HISTORY:  Family History   Problem Relation Age of Onset    Lung cancer Mother     Cancer Maternal Aunt     Breast cancer Maternal Aunt        SOCIAL HISTORY:  Social History Tobacco Use    Smoking status: Former Smoker     Packs/day: 0 50     Years: 23 00     Pack years: 11 50     Types: Cigarettes     Start date: 1981     Quit date: 2004     Years since quittin 7    Smokeless tobacco: Never Used   Vaping Use    Vaping Use: Never used   Substance Use Topics    Alcohol use: Not Currently     Alcohol/week: 3 0 standard drinks     Types: 3 Standard drinks or equivalent per week     Comment: Alcohol intake:   Occasional  - As per Chris Cheema Drug use: No       MEDICATIONS:    Current Outpatient Medications:     Ascorbic Acid (VITAMIN C) 500 MG CAPS, Take by mouth, Disp: , Rfl:     aspirin 81 MG tablet, Take 81 mg by mouth, Disp: , Rfl:     atorvastatin (LIPITOR) 20 mg tablet, TAKE 1 TABLET BY MOUTH DAILY AT BEDTIME, Disp: 90 tablet, Rfl: 3    Calcium Ascorbate 500 MG TABS, Take 500 mg by mouth, Disp: , Rfl:     Calcium Carb-Cholecalciferol (CALCIUM CARBONATE-VITAMIN D3 PO), Take by mouth, Disp: , Rfl:     Cholecalciferol (VITAMIN D3) 5000 units CAPS, Take by mouth, Disp: , Rfl:     Cranberry 1000 MG CAPS, Take 4,200 mg by mouth, Disp: , Rfl:     letrozole (FEMARA) 2 5 mg tablet, Take 2 5 mg by mouth daily, Disp: , Rfl:     MEGARED OMEGA-3 KRILL OIL PO, Take by mouth, Disp: , Rfl:     Vitamin E 400 units TABS, Take 400 Units by mouth, Disp: , Rfl:     ALLERGIES:  No Known Allergies    REVIEW OF SYSTEMS:  Pertinent items are noted in HPI  A comprehensive review of systems was negative      LABS:  HgA1c: No results found for: HGBA1C  BMP:   Lab Results   Component Value Date    GLUCOSE 92 10/22/2015    CALCIUM 9 1 2021     10/22/2015    K 4 1 2021    CO2 29 2021     2021    BUN 17 2021    CREATININE 0 92 2021         _____________________________________________________  PHYSICAL EXAMINATION:  Vital signs: Pulse 90   Ht 5' 6" (1 676 m)   Wt 64 8 kg (142 lb 12 8 oz)   SpO2 98%   BMI 23 05 kg/m²   General: well developed and well nourished, alert, oriented times 3 and appears comfortable  Psychiatric: Normal  HEENT: Trachea Midline, No torticollis  Cardiovascular: No discernable arrhythmia  Pulmonary: No wheezing or stridor  Abdomen: No rebound or guarding  Extremities: No peripheral edema  Skin: Mass: there is a small, <1cm mass on the dorsal aspect of the index PIP  Soft, mobile, non-tender  No discoloration  No swelling  No Erythema, No Lacerations, No Fluctuation, No Ulcerations  Neurovascular: Sensation Intact to the Median, Ulnar, Radial Nerve, Motor Intact to the Median, Ulnar, Radial Nerve and Pulses Intact    MUSCULOSKELETAL EXAMINATION:    Mass: there is a small, <1cm mass on the dorsal aspect of the index PIP  Soft, mobile, non-tender  No discoloration  No swelling      _____________________________________________________  STUDIES REVIEWED:  Xrays are negative for acute displaced fracture        PROCEDURES PERFORMED:  Hand/upper extremity injection  Universal Protocol:  Consent: Verbal consent obtained    Risks and benefits: risks, benefits and alternatives were discussed  Consent given by: patient  Timeout called at: 3/3/2022 3:45 PM   Patient understanding: patient states understanding of the procedure being performed  Patient identity confirmed: verbally with patient    Procedure Details  Condition comment: ganglion cyst, dorsal, index, PIP   Needle size: 18 G  Ultrasound guidance: no  Approach: dorsal  Aspirate amount: 0 mL    Patient tolerance: patient tolerated the procedure well with no immediate complications  Dressing:  Sterile dressing applied

## 2022-03-28 ENCOUNTER — OFFICE VISIT (OUTPATIENT)
Dept: OBGYN CLINIC | Facility: CLINIC | Age: 62
End: 2022-03-28
Payer: COMMERCIAL

## 2022-03-28 VITALS
HEIGHT: 66 IN | DIASTOLIC BLOOD PRESSURE: 80 MMHG | WEIGHT: 140.6 LBS | BODY MASS INDEX: 22.6 KG/M2 | SYSTOLIC BLOOD PRESSURE: 138 MMHG | HEART RATE: 85 BPM

## 2022-03-28 DIAGNOSIS — M67.449 GANGLION CYST OF FINGER: Primary | ICD-10-CM

## 2022-03-28 PROCEDURE — 1036F TOBACCO NON-USER: CPT | Performed by: ORTHOPAEDIC SURGERY

## 2022-03-28 PROCEDURE — 99214 OFFICE O/P EST MOD 30 MIN: CPT | Performed by: ORTHOPAEDIC SURGERY

## 2022-03-28 PROCEDURE — 3008F BODY MASS INDEX DOCD: CPT | Performed by: ORTHOPAEDIC SURGERY

## 2022-03-28 NOTE — PROGRESS NOTES
Assessment/Plan:  1  Ganglion cyst of finger         Scribe Attestation    I,:  Meredith Darby am acting as a scribe while in the presence of the attending physician :       I,:  Cecelia Lord, DO personally performed the services described in this documentation    as scribed in my presence :         25-year-old right index finger PIP mucoid cyst   Patient and I discussed her condition and that a cyst can form secondary to underlying arthritis  She does have some mild arthritis at the PIP joint on x-ray  We discussed she can continue to monitor the mass and use topical anti-inflammatories such as Voltaren gel or surgically excise the mass  Patient does not wish to proceed with surgery at this time  She will continue to monitor the cyst   She may continue activities tolerated  We did discuss if she any signs of draining or skin breakdown over the cyst I would like to see her sooner rather than later  Otherwise I will follow her as needed basis  Subjective:   Lexie Palm is a 57 y o  female who presents to the office today for evaluation her right index finger mass  Patient states she has had a mass at the PIP joint for several months with no known incident of injury or trauma  The mass is not painful for her  She has fluctuation in size the mass but does note over the last month or so the mass has gotten slightly larger  She did previously see DENICE Mendoza who attempted aspiration this was unsuccessful  Patient reports the mass is not bothersome in regards to her activities of daily living  She does type and write frequently as she is an   Review of Systems   Constitutional: Negative for chills, fever and unexpected weight change  HENT: Negative for hearing loss, nosebleeds and sore throat  Eyes: Negative for pain, redness and visual disturbance  Respiratory: Negative for cough, shortness of breath and wheezing      Cardiovascular: Negative for chest pain, palpitations and leg swelling  Gastrointestinal: Negative for abdominal pain, nausea and vomiting  Endocrine: Negative for polydipsia and polyuria  Genitourinary: Negative for dyspareunia and hematuria  Musculoskeletal: Negative for arthralgias, joint swelling and myalgias  Skin: Negative for rash and wound  Neurological: Negative for dizziness, numbness and headaches  Psychiatric/Behavioral: Negative for decreased concentration and suicidal ideas  The patient is not nervous/anxious            Past Medical History:   Diagnosis Date    Breast cancer ([facility] )     Stage 1    Cervical cancer ([facility] )     Hyperlipidemia     Urinary tract infection        Past Surgical History:   Procedure Laterality Date    BREAST LUMPECTOMY Right 2017    BREAST LUMPECTOMY      BREAST LUMPECTOMY      malignant- s/p radiation     SECTION      COLONOSCOPY  2019    CYSTOSCOPY  2019    HYSTERECTOMY      PARTIAL HYSTERECTOMY         Family History   Problem Relation Age of Onset    Lung cancer Mother     Cancer Maternal Aunt     Breast cancer Maternal Aunt        Social History     Occupational History    Not on file   Tobacco Use    Smoking status: Former Smoker     Packs/day: 0 50     Years: 23 00     Pack years: 11 50     Types: Cigarettes     Start date: 1981     Quit date: 2004     Years since quittin 8    Smokeless tobacco: Never Used   Vaping Use    Vaping Use: Never used   Substance and Sexual Activity    Alcohol use: Not Currently     Alcohol/week: 3 0 standard drinks     Types: 3 Standard drinks or equivalent per week     Comment: Alcohol intake:   Occasional  - As per Rhianna Patel Drug use: No    Sexual activity: Not Currently         Current Outpatient Medications:     Ascorbic Acid (VITAMIN C) 500 MG CAPS, Take by mouth, Disp: , Rfl:     aspirin 81 MG tablet, Take 81 mg by mouth, Disp: , Rfl:     atorvastatin (LIPITOR) 20 mg tablet, TAKE 1 TABLET BY MOUTH DAILY AT BEDTIME, Disp: 90 tablet, Rfl: 3    Calcium Ascorbate 500 MG TABS, Take 500 mg by mouth, Disp: , Rfl:     Calcium Carb-Cholecalciferol (CALCIUM CARBONATE-VITAMIN D3 PO), Take by mouth, Disp: , Rfl:     Cholecalciferol (VITAMIN D3) 5000 units CAPS, Take by mouth, Disp: , Rfl:     Cranberry 1000 MG CAPS, Take 4,200 mg by mouth, Disp: , Rfl:     letrozole (FEMARA) 2 5 mg tablet, Take 2 5 mg by mouth daily, Disp: , Rfl:     MEGARED OMEGA-3 KRILL OIL PO, Take by mouth, Disp: , Rfl:     Vitamin E 400 units TABS, Take 400 Units by mouth, Disp: , Rfl:     No Known Allergies    Objective:  Vitals:    03/28/22 1551   BP: 138/80   Pulse: 85       Ortho Exam   Right index finger   Skin intact   No erythema or ecchymosis noted   Dorsal PIP mass 4 mm in diameter   Mass is non tender   Mass is soft and mobile   Negative tinels at the mass   0-90 PIP motion   Sensation intact to the radial and ulnar aspect of the digit   Brisk capillary refill noted     Physical Exam  Vitals reviewed  Constitutional:       Appearance: She is well-developed  HENT:      Head: Normocephalic and atraumatic  Eyes:      General: No scleral icterus  Conjunctiva/sclera: Conjunctivae normal    Cardiovascular:      Rate and Rhythm: Normal rate  Pulses: Normal pulses  Pulmonary:      Effort: Pulmonary effort is normal  No respiratory distress  Musculoskeletal:      Cervical back: Normal range of motion and neck supple  Comments: As noted in HPI   Skin:     General: Skin is warm and dry  Neurological:      Mental Status: She is alert and oriented to person, place, and time     Psychiatric:         Behavior: Behavior normal          I have personally reviewed pertinent films in PACS and my interpretation is as follows:  X-rays of the right index finger obtained on 3/3/22 demonstrate mild degenerative changes at the PIP joint

## 2022-05-06 ENCOUNTER — RA CDI HCC (OUTPATIENT)
Dept: OTHER | Facility: HOSPITAL | Age: 62
End: 2022-05-06

## 2022-05-06 NOTE — PROGRESS NOTES
[facility]  coding opportunities       Chart reviewed, no opportunity found: CHART REVIEWED, NO OPPORTUNITY FOUND        Patients Insurance        Commercial Insurance: [id number]     Please review/recertify the BPA diagnoses for 2022      If this is correct, please assess and document during your next visit, May 13

## 2022-05-26 ENCOUNTER — TELEPHONE (OUTPATIENT)
Dept: FAMILY MEDICINE CLINIC | Facility: CLINIC | Age: 62
End: 2022-05-26

## 2022-05-26 ENCOUNTER — APPOINTMENT (OUTPATIENT)
Dept: LAB | Facility: CLINIC | Age: 62
End: 2022-05-26
Payer: COMMERCIAL

## 2022-05-26 DIAGNOSIS — E78.00 PURE HYPERCHOLESTEROLEMIA: ICD-10-CM

## 2022-05-26 DIAGNOSIS — N18.2 CKD (CHRONIC KIDNEY DISEASE) STAGE 2, GFR 60-89 ML/MIN: ICD-10-CM

## 2022-05-26 LAB
ANION GAP SERPL CALCULATED.3IONS-SCNC: 9 MMOL/L (ref 4–13)
BUN SERPL-MCNC: 18 MG/DL (ref 5–25)
CALCIUM SERPL-MCNC: 9.3 MG/DL (ref 8.4–10.2)
CHLORIDE SERPL-SCNC: 102 MMOL/L (ref 96–108)
CHOLEST SERPL-MCNC: 192 MG/DL
CO2 SERPL-SCNC: 28 MMOL/L (ref 21–32)
CREAT SERPL-MCNC: 0.74 MG/DL (ref 0.6–1.3)
GFR SERPL CREATININE-BSD FRML MDRD: 86 ML/MIN/1.73SQ M
GLUCOSE P FAST SERPL-MCNC: 110 MG/DL (ref 65–99)
HDLC SERPL-MCNC: 71 MG/DL
LDLC SERPL CALC-MCNC: 68 MG/DL (ref 0–100)
NONHDLC SERPL-MCNC: 121 MG/DL
POTASSIUM SERPL-SCNC: 3.7 MMOL/L (ref 3.5–5.3)
SODIUM SERPL-SCNC: 139 MMOL/L (ref 135–147)
TRIGL SERPL-MCNC: 267 MG/DL

## 2022-05-26 PROCEDURE — 80048 BASIC METABOLIC PNL TOTAL CA: CPT

## 2022-05-26 PROCEDURE — 80061 LIPID PANEL: CPT

## 2022-05-26 PROCEDURE — 36415 COLL VENOUS BLD VENIPUNCTURE: CPT

## 2022-05-26 NOTE — TELEPHONE ENCOUNTER
----- Message from Jody Ocasio MD sent at 5/26/2022  8:42 AM EDT -----  Please call the patient regarding her abnormal result  Triglyceride went up to 267   Will discuss on 06/13 appointment

## 2022-06-13 ENCOUNTER — OFFICE VISIT (OUTPATIENT)
Dept: FAMILY MEDICINE CLINIC | Facility: CLINIC | Age: 62
End: 2022-06-13
Payer: COMMERCIAL

## 2022-06-13 VITALS
BODY MASS INDEX: 22.95 KG/M2 | WEIGHT: 142.8 LBS | SYSTOLIC BLOOD PRESSURE: 122 MMHG | OXYGEN SATURATION: 98 % | HEIGHT: 66 IN | TEMPERATURE: 97 F | DIASTOLIC BLOOD PRESSURE: 80 MMHG | RESPIRATION RATE: 16 BRPM | HEART RATE: 76 BPM

## 2022-06-13 DIAGNOSIS — Z12.4 SCREENING FOR CERVICAL CANCER: ICD-10-CM

## 2022-06-13 DIAGNOSIS — N18.2 CKD (CHRONIC KIDNEY DISEASE) STAGE 2, GFR 60-89 ML/MIN: ICD-10-CM

## 2022-06-13 DIAGNOSIS — E78.00 PURE HYPERCHOLESTEROLEMIA: Primary | ICD-10-CM

## 2022-06-13 DIAGNOSIS — E74.39 GLUCOSE INTOLERANCE: ICD-10-CM

## 2022-06-13 PROCEDURE — 3008F BODY MASS INDEX DOCD: CPT | Performed by: INTERNAL MEDICINE

## 2022-06-13 PROCEDURE — 99214 OFFICE O/P EST MOD 30 MIN: CPT | Performed by: INTERNAL MEDICINE

## 2022-06-13 PROCEDURE — 1036F TOBACCO NON-USER: CPT | Performed by: INTERNAL MEDICINE

## 2022-06-13 NOTE — ASSESSMENT & PLAN NOTE
Lab Results   Component Value Date    EGFR 86 05/26/2022    EGFR 67 11/04/2021    EGFR 78 06/29/2021    CREATININE 0 74 05/26/2022    CREATININE 0 92 11/04/2021    CREATININE 0 81 06/29/2021   gfr 86 from 67  Bmp reviewed and interpreted

## 2022-06-13 NOTE — ASSESSMENT & PLAN NOTE
ldl ok but TG went up to 267 from 175  fbs 110  Counseling given on low carb diet  Try fish oil 2 gm daily

## 2022-06-13 NOTE — PROGRESS NOTES
Assessment/Plan:         Problem List Items Addressed This Visit        Genitourinary    CKD (chronic kidney disease) stage 2, GFR 60-89 ml/min     Lab Results   Component Value Date    EGFR 86 2022    EGFR 67 2021    EGFR 78 2021    CREATININE 0 74 2022    CREATININE 0 92 2021    CREATININE 0 81 2021   gfr 86 from 67  Bmp reviewed and interpreted  Other    Pure hypercholesterolemia - Primary     ldl ok but TG went up to 267 from 175  fbs 110  Counseling given on low carb diet  Try fish oil 2 gm daily  Glucose intolerance     fbs 110  Consider metformin  Watch diet/exercise             Other Visit Diagnoses     Screening for cervical cancer                Subjective:      Patient ID: Carmen Prescott is a 60 y o  female  1  Dyslipidemia- tolerating statin well  No myalgia or muscle cramps  No abdominal pain nausea or vomiting  No claudication pain  No chest pain or dyspnea  2  ckd-2- GFR improved to 86 from 77 this time  No trouble urinating  No edema  3  Hyperglycemia-fasting blood sugar around 110  No polyuria or polydipsia  No numbness or tingling  No significant weight changes  No change in the vision other than she has a cataract        The following portions of the patient's history were reviewed and updated as appropriate:   Past Medical History:  She has a past medical history of Breast cancer ([facility] [address] [facility] ), Cervical cancer ([facility] [address] ), Hyperlipidemia, and Urinary tract infection  ,  _______________________________________________________________________  Medical Problems:  does not have any pertinent problems on file ,  _______________________________________________________________________  Past Surgical History:   has a past surgical history that includes Partial hysterectomy; Breast lumpectomy (Right, 2017); Breast lumpectomy; Cystoscopy (2019); Hysterectomy;   section; Breast lumpectomy; and Colonoscopy (08/03/2019)  ,  _______________________________________________________________________  Family History:  family history includes Breast cancer in her maternal aunt; Cancer in her maternal aunt; Lung cancer in her mother ,  _______________________________________________________________________  Social History:   reports that she quit smoking about 18 years ago  Her smoking use included cigarettes  She started smoking about 41 years ago  She has a 11 50 pack-year smoking history  She has never used smokeless tobacco  She reports previous alcohol use of about 3 0 standard drinks of alcohol per week  She reports that she does not use drugs  ,  _______________________________________________________________________  Allergies:  has No Known Allergies     _______________________________________________________________________  Current Outpatient Medications   Medication Sig Dispense Refill    Ascorbic Acid (VITAMIN C) 500 MG CAPS Take by mouth      aspirin 81 MG tablet Take 81 mg by mouth      atorvastatin (LIPITOR) 20 mg tablet TAKE 1 TABLET BY MOUTH DAILY AT BEDTIME 90 tablet 3    Calcium Ascorbate 500 MG TABS Take 500 mg by mouth      Calcium Carb-Cholecalciferol (CALCIUM CARBONATE-VITAMIN D3 PO) Take by mouth      Cholecalciferol (VITAMIN D3) 5000 units CAPS Take by mouth      Cranberry 1000 MG CAPS Take 4,200 mg by mouth      letrozole (FEMARA) 2 5 mg tablet Take 2 5 mg by mouth daily      MEGARED OMEGA-3 KRILL OIL PO Take by mouth      Vitamin E 400 units TABS Take 400 Units by mouth       No current facility-administered medications for this visit      _______________________________________________________________________  Review of Systems   Constitutional: Negative for appetite change, chills, diaphoresis, fatigue and fever  HENT: Negative for congestion, drooling and sinus pain  Eyes: Negative for discharge and itching  Respiratory: Negative for cough, chest tightness and shortness of breath  Cardiovascular: Negative for chest pain, palpitations and leg swelling  Gastrointestinal: Negative  Endocrine: Negative for polyphagia and polyuria  Genitourinary: Negative for difficulty urinating, dysuria, frequency and urgency  Skin: Negative for pallor and rash  Allergic/Immunologic: Negative for food allergies  Neurological: Negative for dizziness, seizures, speech difficulty, light-headedness, numbness and headaches  Hematological: Negative for adenopathy  Does not bruise/bleed easily  Psychiatric/Behavioral: Negative for agitation, confusion, decreased concentration and suicidal ideas  Objective:  Vitals:    06/13/22 1602   BP: 122/80   BP Location: Left arm   Patient Position: Sitting   Cuff Size: Standard   Pulse: 76   Resp: 16   Temp: (!) 97 °F (36 1 °C)   TempSrc: Temporal   SpO2: 98%   Weight: 64 8 kg (142 lb 12 8 oz)   Height: 5' 6" (1 676 m)     Body mass index is 23 05 kg/m²  Physical Exam  Vitals and nursing note reviewed  Constitutional:       Appearance: Normal appearance  She is well-developed  She is not ill-appearing  HENT:      Head: Atraumatic  Eyes:      General:         Right eye: No discharge  Left eye: No discharge  Neck:      Thyroid: No thyromegaly  Cardiovascular:      Rate and Rhythm: Normal rate and regular rhythm  Pulses: Normal pulses  Heart sounds: Normal heart sounds  Pulmonary:      Effort: Pulmonary effort is normal       Breath sounds: Normal breath sounds  No wheezing  Chest:      Chest wall: No tenderness  Abdominal:      General: Bowel sounds are normal  There is no distension  Palpations: Abdomen is soft  Tenderness: There is no abdominal tenderness  Musculoskeletal:         General: No tenderness  Cervical back: Neck supple  Right lower leg: No edema  Left lower leg: No edema  Lymphadenopathy:      Cervical: No cervical adenopathy  Skin:     Findings: No erythema  Neurological:      Mental Status: She is alert and oriented to person, place, and time     Psychiatric:         Mood and Affect: Mood normal          Behavior: Behavior normal          Judgment: Judgment normal

## 2022-07-22 ENCOUNTER — APPOINTMENT (OUTPATIENT)
Dept: LAB | Facility: CLINIC | Age: 62
End: 2022-07-22
Payer: COMMERCIAL

## 2022-07-22 DIAGNOSIS — Z17.0 ESTROGEN RECEPTOR POSITIVE: ICD-10-CM

## 2022-07-22 DIAGNOSIS — C50.411 MALIGNANT NEOPLASM OF UPPER-OUTER QUADRANT OF RIGHT FEMALE BREAST, UNSPECIFIED ESTROGEN RECEPTOR STATUS (HCC): ICD-10-CM

## 2022-07-22 LAB
ALBUMIN SERPL BCP-MCNC: 4.3 G/DL (ref 3.5–5)
ALP SERPL-CCNC: 53 U/L (ref 34–104)
ALT SERPL W P-5'-P-CCNC: 13 U/L (ref 7–52)
ANION GAP SERPL CALCULATED.3IONS-SCNC: 8 MMOL/L (ref 4–13)
AST SERPL W P-5'-P-CCNC: 19 U/L (ref 13–39)
BASOPHILS # BLD AUTO: 0.06 THOUSANDS/ΜL (ref 0–0.1)
BASOPHILS NFR BLD AUTO: 1 % (ref 0–1)
BILIRUB SERPL-MCNC: 0.62 MG/DL (ref 0.2–1)
BUN SERPL-MCNC: 14 MG/DL (ref 5–25)
CALCIUM SERPL-MCNC: 9.3 MG/DL (ref 8.4–10.2)
CHLORIDE SERPL-SCNC: 105 MMOL/L (ref 96–108)
CO2 SERPL-SCNC: 27 MMOL/L (ref 21–32)
CREAT SERPL-MCNC: 0.75 MG/DL (ref 0.6–1.3)
EOSINOPHIL # BLD AUTO: 0.16 THOUSAND/ΜL (ref 0–0.61)
EOSINOPHIL NFR BLD AUTO: 2 % (ref 0–6)
ERYTHROCYTE [DISTWIDTH] IN BLOOD BY AUTOMATED COUNT: 12.7 % (ref 11.6–15.1)
GFR SERPL CREATININE-BSD FRML MDRD: 85 ML/MIN/1.73SQ M
GLUCOSE P FAST SERPL-MCNC: 106 MG/DL (ref 65–99)
HCT VFR BLD AUTO: 40.3 % (ref 34.8–46.1)
HGB BLD-MCNC: 13 G/DL (ref 11.5–15.4)
IMM GRANULOCYTES # BLD AUTO: 0.05 THOUSAND/UL (ref 0–0.2)
IMM GRANULOCYTES NFR BLD AUTO: 1 % (ref 0–2)
LYMPHOCYTES # BLD AUTO: 1.54 THOUSANDS/ΜL (ref 0.6–4.47)
LYMPHOCYTES NFR BLD AUTO: 21 % (ref 14–44)
MCH RBC QN AUTO: 29.5 PG (ref 26.8–34.3)
MCHC RBC AUTO-ENTMCNC: 32.3 G/DL (ref 31.4–37.4)
MCV RBC AUTO: 92 FL (ref 82–98)
MONOCYTES # BLD AUTO: 0.65 THOUSAND/ΜL (ref 0.17–1.22)
MONOCYTES NFR BLD AUTO: 9 % (ref 4–12)
NEUTROPHILS # BLD AUTO: 5.04 THOUSANDS/ΜL (ref 1.85–7.62)
NEUTS SEG NFR BLD AUTO: 66 % (ref 43–75)
NRBC BLD AUTO-RTO: 0 /100 WBCS
PLATELET # BLD AUTO: 259 THOUSANDS/UL (ref 149–390)
PMV BLD AUTO: 10.4 FL (ref 8.9–12.7)
POTASSIUM SERPL-SCNC: 3.8 MMOL/L (ref 3.5–5.3)
PROT SERPL-MCNC: 6.9 G/DL (ref 6.4–8.4)
RBC # BLD AUTO: 4.4 MILLION/UL (ref 3.81–5.12)
SODIUM SERPL-SCNC: 140 MMOL/L (ref 135–147)
WBC # BLD AUTO: 7.5 THOUSAND/UL (ref 4.31–10.16)

## 2022-07-22 PROCEDURE — 36415 COLL VENOUS BLD VENIPUNCTURE: CPT

## 2022-07-22 PROCEDURE — 85025 COMPLETE CBC W/AUTO DIFF WBC: CPT

## 2022-07-22 PROCEDURE — 80053 COMPREHEN METABOLIC PANEL: CPT

## 2022-08-04 DIAGNOSIS — E78.2 MIXED HYPERLIPIDEMIA: ICD-10-CM

## 2022-08-05 RX ORDER — ATORVASTATIN CALCIUM 20 MG/1
TABLET, FILM COATED ORAL
Qty: 90 TABLET | Refills: 3 | Status: SHIPPED | OUTPATIENT
Start: 2022-08-05

## 2022-09-27 ENCOUNTER — TELEMEDICINE (OUTPATIENT)
Dept: FAMILY MEDICINE CLINIC | Facility: CLINIC | Age: 62
End: 2022-09-27
Payer: COMMERCIAL

## 2022-09-27 DIAGNOSIS — U07.1 COVID-19: Primary | ICD-10-CM

## 2022-09-27 PROCEDURE — 99213 OFFICE O/P EST LOW 20 MIN: CPT | Performed by: INTERNAL MEDICINE

## 2022-09-27 RX ORDER — NIRMATRELVIR AND RITONAVIR 300-100 MG
3 KIT ORAL 2 TIMES DAILY
Qty: 30 TABLET | Refills: 0 | Status: SHIPPED | OUTPATIENT
Start: 2022-09-27 | End: 2022-10-02

## 2022-09-27 NOTE — PROGRESS NOTES
COVID-19 Outpatient Progress Note    Assessment/Plan:    Problem List Items Addressed This Visit        Other    COVID-19 - Primary     Day#2  Start Paxlovid  Recent GFR was 85  Possible benefits and side effects were discussed with the patient  She was informed that this medicine is approved under emergency use authorization  Advised to hold atorvastatin for 5 days while taking antiviral pills  Take Tylenol as needed  Relevant Medications    nirmatrelvir & ritonavir (Paxlovid, 300/100,) tablet therapy pack         Disposition:     Patient meets criteria for PAXLOVID and they have been counseled appropriately according to EUA documentation released by the FDA  After discussion, patient agrees to treatment  [address] is an investigational medicine used to treat mild-to-moderate COVID-19 in adults and children (15years of age and older weighing at least 80 pounds (40 kg)) with positive results of direct SARS-CoV-2 viral testing, and who are at high risk for progression to severe COVID-19, including hospitalization or death  PAXLOVID is investigational because it is still being studied  There is limited information about the safety and effectiveness of using PAXLOVID to treat people with mild-to-moderate COVID-19  The FDA has authorized the emergency use of PAXLOVID for the treatment of mild-tomoderate COVID-19 in adults and children (15years of age and older weighing at least 80 pounds (40 kg)) with a positive test for the virus that causes COVID-19, and who are at high risk for progression to severe COVID-19, including hospitalization or death, under an EUA  What should I tell my healthcare provider before I take PAXLOVID?     Tell your healthcare provider if you:  - Have any allergies  - Have liver or kidney disease  - Are pregnant or plan to become pregnant  - Are breastfeeding a child  - Have any serious illnesses    Tell your healthcare provider about all the medicines you take, including prescription and over-the-counter medicines, vitamins, and herbal supplements  Some medicines may interact with PAXLOVID and may cause serious side effects  Keep a list of your medicines to show your healthcare provider and pharmacist when you get a new medicine  You can ask your healthcare provider or pharmacist for a list of medicines that interact with PAXLOVID  Do not start taking a new medicine without telling your healthcare provider  Your healthcare provider can tell you if it is safe to take PAXLOVID with other medicines  Tell your healthcare provider if you are taking combined hormonal contraceptive  PAXLOVID may affect how your birth control pills work  Females who are able to become pregnant should use another effective alternative form of contraception or an additional barrier method of contraception  Talk to your healthcare provider if you have any questions about contraceptive methods that might be right for you  How do I take PAXLOVID? PAXLOVID consists of 2 medicines: nirmatrelvir and ritonavir  - Take 2 pink tablets of nirmatrelvir with 1 white tablet of ritonavir by mouth 2 times each day (in the morning and in the evening) for 5 days  For each dose, take all 3 tablets at the same time  - If you have kidney disease, talk to your healthcare provider  You may need a different dose  - Swallow the tablets whole  Do not chew, break, or crush the tablets  - Take PAXLOVID with or without food  - Do not stop taking PAXLOVID without talking to your healthcare provider, even if you feel better  - If you miss a dose of PAXLOVID within 8 hours of the time it is usually taken, take it as soon as you remember  If you miss a dose by more than 8 hours, skip the missed dose and take the next dose at your regular time  Do not take 2 doses of PAXLOVID at the same time  - If you take too much PAXLOVID, call your healthcare provider or go to the nearest hospital emergency room right away    - If you are taking a ritonavir- or cobicistat-containing medicine to treat hepatitis C or Human Immunodeficiency Virus (HIV), you should continue to take your medicine as prescribed by your healthcare provider   - Talk to your healthcare provider if you do not feel better or if you feel worse after 5 days  Who should generally not take PAXLOVID? Do not take PAXLOVID if:  You are allergic to nirmatrelvir, ritonavir, or any of the ingredients in PAXLOVID  You are taking any of the following medicines:  - Alfuzosin  - Pethidine, piroxicam, propoxyphene  - Ranolazine  - Amiodarone, dronedarone, flecainide, propafenone, quinidine  - Colchicine  - Lurasidone, pimozide, clozapine  - Dihydroergotamine, ergotamine, methylergonovine  - Lovastatin, simvastatin  - Sildenafil (Revatio®) for pulmonary arterial hypertension (PAH)  - Triazolam, oral midazolam  - Apalutamide  - Carbamazepine, phenobarbital, phenytoin  - Rifampin  - St  Ibans Wort (hypericum perforatum)    What are the important possible side effects of PAXLOVID? Possible side effects of PAXLOVID are:  - Liver Problems  Tell your healthcare provider right away if you have any of these signs and symptoms of liver problems: loss of appetite, yellowing of your skin and the whites of eyes (jaundice), dark-colored urine, pale colored stools and itchy skin, stomach area (abdominal) pain  - Resistance to HIV Medicines  If you have untreated HIV infection, PAXLOVID may lead to some HIV medicines not working as well in the future  - Other possible side effects include: altered sense of taste, diarrhea, high blood pressure, or muscle aches    These are not all the possible side effects of PAXLOVID  Not many people have taken PAXLOVID  Serious and unexpected side effects may happen  Walt Castaneda is still being studied, so it is possible that all of the risks are not known at this time  What other treatment choices are there?   Like Kate Rodriguez may allow for the emergency use of other medicines to treat people with COVID-19  Go to https://[employer]/ for information on the emergency use of other medicines that are authorized by FDA to treat people with COVID-19  Your healthcare provider may talk with you about clinical trials for which you may be eligible  It is your choice to be treated or not to be treated with PAXLOVID  Should you decide not to receive it or for your child not to receive it, it will not change your standard medical care  What if I am pregnant or breastfeeding? There is no experience treating pregnant women or breastfeeding mothers with PAXLOVID  For a mother and unborn baby, the benefit of taking PAXLOVID may be greater than the risk from the treatment  If you are pregnant, discuss your options and specific situation with your healthcare provider  It is recommended that you use effective barrier contraception or do not have sexual activity while taking PAXLOVID  If you are breastfeeding, discuss your options and specific situation with your healthcare provider  How do I report side effects with PAXLOVID? Contact your healthcare provider if you have any side effects that bother you or do not go away  Report side effects to FDA MedWatch at www fda gov/medwatch or call 3-509-QJM2582 or you can report side effects to [employer]  at the contact information provided below  Website Fax number Telephone number   [place] [phone]     How should I store [address]? Store PAXLOVID tablets at room temperature between 68°F to 77°F (20°C to 25°C)  Full fact sheet for patients, parents, and caregivers can be found at: [facility] [employer]    I have spent 15 minutes directly with the patient   Greater than 50% of this time was spent in counseling/coordination of care regarding: risks and benefits of treatment options  Encounter provider: Hermelinda Welsh MD     Provider located at: [address]  [place] [address] [facility] [place] [address] [phone]     Recent Visits  No visits were found meeting these conditions  Showing recent visits within past 7 days and meeting all other requirements  Today's Visits  Date Type Provider Dept   09/27/22 Telemedicine Katie Rowe MD  Primary Care [place] [address]   Showing today's visits and meeting all other requirements  Future Appointments  No visits were found meeting these conditions  Showing future appointments within next 150 days and meeting all other requirements     This virtual check-in was done via [employer] and patient was informed that this is a secure, HIPAA-compliant platform  She agrees to proceed  Patient agrees to participate in a virtual check in via telephone or video visit instead of presenting to the office to address urgent/immediate medical needs  Patient is aware this is a billable service  She acknowledged consent and understanding of privacy and security of the video platform  The patient has agreed to participate and understands they can discontinue the visit at any time  After connecting through [facility], the patient was identified by name and date of birth  Angi Carmona was informed that this was a telemedicine visit and that the exam was being conducted confidentially over secure lines  My office door was closed  No one else was in the room  Angi Carmona acknowledged consent and understanding of privacy and security of the telemedicine visit  I informed the patient that I have reviewed her record in Epic and presented the opportunity for her to ask any questions regarding the visit today  The patient agreed to participate      Verification of patient location:  Patient is located in the following state in which I hold an active license: [place]    Subjective: Angi Carmona is a 60 y o  female who is concerned about COVID-19  Patient's symptoms include fever, chills, fatigue, rhinorrhea and myalgias  Patient denies malaise, congestion, sore throat (scratchy), anosmia, loss of taste, cough, shortness of breath, chest tightness, abdominal pain, nausea, vomiting, diarrhea and headaches  - Date of symptom onset: 9/25/2022      COVID-19 vaccination status: Fully vaccinated with booster    Exposure:   Contact with a person who is under investigation (PUI) for or who is positive for COVID-19 within the last 14 days?: No    Hospitalized recently for fever and/or lower respiratory symptoms?: No      Currently a healthcare worker that is involved in direct patient care?: No      Works in a special setting where the risk of COVID-19 transmission may be high? (this may include long-term care, correctional and alf facilities; homeless shelters; assisted-living facilities and group homes ): No      Resident in a special setting where the risk of COVID-19 transmission may be high? (this may include long-term care, correctional and alf facilities; homeless shelters; assisted-living facilities and group homes ): No      Home COVID test is positive    Lab Results   Component Value Date    SARSCOV2 NOT DETECTED 08/21/2021       Review of Systems   Constitutional: Positive for chills, fatigue and fever  HENT: Positive for rhinorrhea  Negative for congestion and sore throat (scratchy)  Respiratory: Negative for cough, chest tightness and shortness of breath  Gastrointestinal: Negative for abdominal pain, diarrhea, nausea and vomiting  Musculoskeletal: Positive for myalgias  Neurological: Negative for headaches       Current Outpatient Medications on File Prior to Visit   Medication Sig    Ascorbic Acid (VITAMIN C) 500 MG CAPS Take by mouth    aspirin 81 MG tablet Take 81 mg by mouth    atorvastatin (LIPITOR) 20 mg tablet TAKE 1 TABLET BY MOUTH DAILY AT BEDTIME    Calcium Ascorbate 500 MG TABS Take 500 mg by mouth    Calcium Carb-Cholecalciferol (CALCIUM CARBONATE-VITAMIN D3 PO) Take by mouth    Cholecalciferol (VITAMIN D3) 5000 units CAPS Take by mouth    Cranberry 1000 MG CAPS Take 4,200 mg by mouth    letrozole (FEMARA) 2 5 mg tablet Take 2 5 mg by mouth daily    MEGARED OMEGA-3 KRILL OIL PO Take by mouth    Vitamin E 400 units TABS Take 400 Units by mouth       Objective: There were no vitals taken for this visit  Physical Exam  Constitutional:       General: She is not in acute distress  Appearance: She is not diaphoretic  Pulmonary:      Effort: Pulmonary effort is normal    Neurological:      Mental Status: She is alert and oriented to person, place, and time         Erin Aranda MD

## 2022-09-27 NOTE — ASSESSMENT & PLAN NOTE
Day#2  Start Paxlovid  Recent GFR was 85  Possible benefits and side effects were discussed with the patient  She was informed that this medicine is approved under emergency use authorization  Advised to hold atorvastatin for 5 days while taking antiviral pills  Take Tylenol as needed

## 2022-12-13 ENCOUNTER — OFFICE VISIT (OUTPATIENT)
Dept: FAMILY MEDICINE CLINIC | Facility: CLINIC | Age: 62
End: 2022-12-13

## 2022-12-13 VITALS
OXYGEN SATURATION: 97 % | BODY MASS INDEX: 22.18 KG/M2 | HEIGHT: 66 IN | SYSTOLIC BLOOD PRESSURE: 130 MMHG | HEART RATE: 74 BPM | WEIGHT: 138 LBS | DIASTOLIC BLOOD PRESSURE: 82 MMHG

## 2022-12-13 DIAGNOSIS — Z23 ENCOUNTER FOR IMMUNIZATION: Primary | ICD-10-CM

## 2022-12-13 DIAGNOSIS — Z17.0 MALIGNANT NEOPLASM OF UPPER-OUTER QUADRANT OF RIGHT BREAST IN FEMALE, ESTROGEN RECEPTOR POSITIVE (HCC): ICD-10-CM

## 2022-12-13 DIAGNOSIS — C50.411 MALIGNANT NEOPLASM OF UPPER-OUTER QUADRANT OF RIGHT BREAST IN FEMALE, ESTROGEN RECEPTOR POSITIVE (HCC): ICD-10-CM

## 2022-12-13 DIAGNOSIS — Z01.818 PRE-OP EVALUATION: ICD-10-CM

## 2022-12-13 DIAGNOSIS — N18.2 CKD (CHRONIC KIDNEY DISEASE) STAGE 2, GFR 60-89 ML/MIN: ICD-10-CM

## 2022-12-13 DIAGNOSIS — E74.39 GLUCOSE INTOLERANCE: ICD-10-CM

## 2022-12-13 DIAGNOSIS — Z12.4 SCREENING FOR CERVICAL CANCER: ICD-10-CM

## 2022-12-13 DIAGNOSIS — E78.00 PURE HYPERCHOLESTEROLEMIA: ICD-10-CM

## 2022-12-13 NOTE — PROGRESS NOTES
Name: Julien Burrell      : 1960      MRN: [id number]  Encounter Provider: Som Malcolm MD  Encounter Date: 2022   Encounter department: [address] [place] [address]  CKD (chronic kidney disease) stage 2, GFR 60-89 ml/min    2  Pre-op evaluation  Assessment & Plan:  Overall patient's condition is medically stable  She is at her optimal health for proposed cataract surgery  Proceed with a low risk  3  Pure hypercholesterolemia  Assessment & Plan:  Continue atorvastatin  Check labs before next visit  Orders:  -     CBC and differential; Future  -     Comprehensive metabolic panel; Future  -     TSH, 3rd generation; Future  -     Lipid panel; Future    4  Glucose intolerance    5  Screening for cervical cancer  -     Ambulatory Referral to Gynecology; Future    6  Malignant neoplasm of upper-outer quadrant of right breast in female, estrogen [facility] )      Depression Screening and Follow-up Plan: Patient was screened for depression during today's encounter  They screened negative with a PHQ-2 score of 0  Subjective      1  Hyperlipidemia  Tolerating statin well  No myalgia or muscle cramps  No claudication pain  No abdominal pain nausea or vomiting  No history of smoking  No chest pain or dyspnea  2  Preoperative eval for cataract surgery  No fever or chills  She is scheduled in first week of January  No palpitations  No dysuria or hematuria  No headache  No rash  No other new complaint  She is physically active  Review of Systems   Constitutional: Negative for appetite change, chills, diaphoresis, fatigue and fever  HENT: Negative for congestion, drooling and sinus pain  Eyes: Negative for discharge and itching  Respiratory: Negative for cough, chest tightness and shortness of breath  Cardiovascular: Negative for chest pain, palpitations and leg swelling  Gastrointestinal: Negative      Endocrine: Negative for polyphagia and polyuria  Genitourinary: Negative for difficulty urinating, dysuria, frequency and urgency  Skin: Negative for pallor and rash  Allergic/Immunologic: Negative for food allergies  Neurological: Negative for dizziness, seizures, speech difficulty, light-headedness, numbness and headaches  Hematological: Negative for adenopathy  Does not bruise/bleed easily  Psychiatric/Behavioral: Negative for agitation, confusion, decreased concentration, dysphoric mood, sleep disturbance and suicidal ideas  Current Outpatient Medications on File Prior to Visit   Medication Sig   • Ascorbic Acid (VITAMIN C) 500 MG CAPS Take by mouth   • aspirin 81 MG tablet Take 81 mg by mouth   • atorvastatin (LIPITOR) 20 mg tablet TAKE 1 TABLET BY MOUTH DAILY AT BEDTIME   • Calcium Ascorbate 500 MG TABS Take 500 mg by mouth   • Calcium Carb-Cholecalciferol (CALCIUM CARBONATE-VITAMIN D3 PO) Take by mouth   • Cholecalciferol (VITAMIN D3) 5000 units CAPS Take by mouth   • Cranberry 1000 MG CAPS Take 4,200 mg by mouth   • letrozole (FEMARA) 2 5 mg tablet Take 2 5 mg by mouth daily   • MEGARED OMEGA-3 KRILL OIL PO Take by mouth   • Vitamin E 400 units TABS Take 400 Units by mouth       Objective     /82 (BP Location: Left arm, Patient Position: Sitting, Cuff Size: Adult)   Pulse 74   Ht 5' 6" (1 676 m)   Wt 62 6 kg (138 lb)   SpO2 97%   BMI 22 27 kg/m²     Physical Exam  Vitals and nursing note reviewed  Constitutional:       Appearance: Normal appearance  She is well-developed  She is not ill-appearing or diaphoretic  HENT:      Head: Atraumatic  Nose: No congestion or rhinorrhea  Eyes:      General:         Right eye: No discharge  Left eye: No discharge  Neck:      Thyroid: No thyromegaly  Cardiovascular:      Rate and Rhythm: Normal rate and regular rhythm  Pulses: Normal pulses  Heart sounds: Normal heart sounds     Pulmonary:      Effort: Pulmonary effort is normal  Breath sounds: Normal breath sounds  No wheezing  Chest:      Chest wall: No tenderness  Abdominal:      General: Bowel sounds are normal  There is no distension  Palpations: Abdomen is soft  There is no mass  Tenderness: There is no abdominal tenderness  Musculoskeletal:         General: No tenderness  Cervical back: Neck supple  Right lower leg: No edema  Left lower leg: No edema  Lymphadenopathy:      Cervical: No cervical adenopathy  Skin:     Findings: No erythema or rash  Neurological:      Mental Status: She is alert and oriented to person, place, and time     Psychiatric:         Mood and Affect: Mood normal          Behavior: Behavior normal          Judgment: Judgment normal        Kei Melendez MD

## 2022-12-13 NOTE — ASSESSMENT & PLAN NOTE
Overall patient's condition is medically stable  She is at her optimal health for proposed cataract surgery  Proceed with a low risk

## 2022-12-14 ENCOUNTER — TELEPHONE (OUTPATIENT)
Dept: ADMINISTRATIVE | Facility: OTHER | Age: 62
End: 2022-12-14

## 2022-12-14 NOTE — TELEPHONE ENCOUNTER
----- Message from Som Ray MD sent at 12/13/2022  4:42 PM EST -----  [facility]

## 2022-12-15 NOTE — TELEPHONE ENCOUNTER
Upon review of the In Basket request we were able to locate, review, and update the patient chart as requested for Mammogram     Any additional questions or concerns should be emailed to the Practice Liaisons via the appropriate education email address, please do not reply via In Basket      Thank you  Bri Mcdonald

## 2023-01-04 NOTE — PRE-PROCEDURE INSTRUCTIONS
Pre-Surgery Instructions:   Medication Instructions   • Ascorbic Acid (VITAMIN C) 500 MG CAPS Hold day of surgery  • aspirin 81 MG tablet Hold day of surgery  • atorvastatin (LIPITOR) 20 mg tablet Hold day of surgery  • Calcium Carb-Cholecalciferol (CALCIUM CARBONATE-VITAMIN D3 PO) Hold day of surgery  • Cholecalciferol (VITAMIN D3) 5000 units CAPS Hold day of surgery  • Cranberry 1000 MG CAPS Hold day of surgery  • letrozole (FEMARA) 2 5 mg tablet Hold day of surgery  • MEGARED OMEGA-3 KRILL OIL PO Hold day of surgery  • Vitamin E 400 units TABS Hold day of surgery  Pre op instructions reviewed with pt via phone  No meds a m of surgery    Fredrik Spatz [phone]  My Surgical Experience    The following information was developed to assist you to prepare for your operation  What do I need to do before coming to the hospital?  • Arrange for a responsible person to drive you to and from the hospital   • Arrange care for your children at home  Children are not allowed in the recovery areas of the hospital  • Plan to wear clothing that is easy to put on and take off  If you are having shoulder surgery, wear a shirt that buttons or zippers in the front  Bathing  o Shower the evening before and the morning of your surgery with an antibacterial soap  Please refer to the Pre Op Showering Instructions for Surgery Patients Sheet   o Remove nail polish and all body piercing jewelry  o Do not shave any body part for at least 24 hours before surgery-this includes face, arms, legs and upper body  Food  o Nothing to eat or drink after midnight the night before your surgery   This includes candy and chewing gum  o Exception: If your surgery is after 12:00pm (noon), you may have clear liquids such as 7-Up®, ginger ale, apple or cranberry juice, Jell-O®, water, or clear broth until 8:00 am  o Do not drink milk or juice with pulp on the morning before surgery  o Do not drink alcohol 24 hours before surgery  Medicine  o Follow instructions you received from your surgeon about which medicines you may take on the day of surgery  o If instructed to take medicine on the morning of surgery, take pills with just a small sip of water  Call your prescribing doctor for specific information on what to do if you take insulin    What should I bring to the hospital?    Bring:  • Crutches or a walker, if you have them, for foot or knee surgery  • A list of the daily medicines, vitamins, minerals, herbals and nutritional supplements you take  Include the dosages of medicines and the time you take them each day  • Glasses, dentures or hearing aids  • Minimal clothing; you will be wearing hospital sleepwear  • Photo ID; required to verify your identity  • If you have a Living Will or Power of , bring a copy of the documents  • If you have an ostomy, bring an extra pouch and any supplies you use    Do not bring  • Medicines or inhalers  • Money, valuables or jewelry    What other information should I know about the day of surgery? • Notify your surgeons if you develop a cold, sore throat, cough, fever, rash or any other illness  • Report to the Ambulatory Surgical/Same Day Surgery Unit  • You will be instructed to stop at Registration only if you have not been pre-registered  • Inform your  fi they do not stay that they will be asked by the staff to leave a phone number where they can be reached  • Be available to be reached before surgery  In the event the operating room schedule changes, you may be asked to come in earlier or later than expected    *It is important to tell your doctor and others involved in your health care if you are taking or have been taking any non-prescription drugs, vitamins, minerals, herbals or other nutritional supplements   Any of these may interact with some food or medicines and cause a reaction

## 2023-01-09 ENCOUNTER — HOSPITAL ENCOUNTER (OUTPATIENT)
Facility: AMBULARY SURGERY CENTER | Age: 63
Setting detail: OUTPATIENT SURGERY
Discharge: HOME | End: 2023-01-09
Attending: OPHTHALMOLOGY | Admitting: OPHTHALMOLOGY

## 2023-01-09 ENCOUNTER — ANESTHESIA (OUTPATIENT)
Dept: PERIOP | Facility: AMBULARY SURGERY CENTER | Age: 63
End: 2023-01-09

## 2023-01-09 ENCOUNTER — ANESTHESIA EVENT (OUTPATIENT)
Dept: PERIOP | Facility: AMBULARY SURGERY CENTER | Age: 63
End: 2023-01-09

## 2023-01-09 VITALS
OXYGEN SATURATION: 95 % | WEIGHT: 132 LBS | DIASTOLIC BLOOD PRESSURE: 71 MMHG | BODY MASS INDEX: 21.31 KG/M2 | TEMPERATURE: 98.2 F | SYSTOLIC BLOOD PRESSURE: 124 MMHG | HEART RATE: 75 BPM | RESPIRATION RATE: 18 BRPM

## 2023-01-09 DIAGNOSIS — H25.042 POSTERIOR SUBCAPSULAR POLAR AGE-RELATED CATARACT OF LEFT EYE: Primary | ICD-10-CM

## 2023-01-09 DEVICE — ACRYSOF(R) IQ ASPHERIC NATURAL IOL, SINGLE-PIECE ACRYLIC FOLDABLE PCL, UV WITH BLUE LIGHTFILTER, 13.0MM LENGTH, 6.0MM ANTERIORASYMMETRIC BICONVEX OPTIC, PLANAR HAPTICS.
Type: IMPLANTABLE DEVICE | Site: EYE | Status: FUNCTIONAL
Brand: ACRYSOF®

## 2023-01-09 RX ORDER — MIDAZOLAM HYDROCHLORIDE 2 MG/2ML
INJECTION, SOLUTION INTRAMUSCULAR; INTRAVENOUS AS NEEDED
Status: DISCONTINUED | OUTPATIENT
Start: 2023-01-09 | End: 2023-01-09

## 2023-01-09 RX ORDER — LIDOCAINE HYDROCHLORIDE 10 MG/ML
INJECTION, SOLUTION EPIDURAL; INFILTRATION; INTRACAUDAL; PERINEURAL AS NEEDED
Status: DISCONTINUED | OUTPATIENT
Start: 2023-01-09 | End: 2023-01-09 | Stop reason: HOSPADM

## 2023-01-09 RX ORDER — KETOROLAC TROMETHAMINE 5 MG/ML
1 SOLUTION OPHTHALMIC
Status: COMPLETED | OUTPATIENT
Start: 2023-01-09 | End: 2023-01-09

## 2023-01-09 RX ORDER — CYCLOPENTOLATE HYDROCHLORIDE 10 MG/ML
1 SOLUTION/ DROPS OPHTHALMIC
Status: COMPLETED | OUTPATIENT
Start: 2023-01-09 | End: 2023-01-09

## 2023-01-09 RX ORDER — PHENYLEPHRINE HCL 2.5 %
1 DROPS OPHTHALMIC (EYE)
Status: COMPLETED | OUTPATIENT
Start: 2023-01-09 | End: 2023-01-09

## 2023-01-09 RX ORDER — PROPOFOL 10 MG/ML
INJECTION, EMULSION INTRAVENOUS AS NEEDED
Status: DISCONTINUED | OUTPATIENT
Start: 2023-01-09 | End: 2023-01-09

## 2023-01-09 RX ORDER — TETRACAINE HYDROCHLORIDE 5 MG/ML
SOLUTION OPHTHALMIC AS NEEDED
Status: DISCONTINUED | OUTPATIENT
Start: 2023-01-09 | End: 2023-01-09 | Stop reason: HOSPADM

## 2023-01-09 RX ORDER — KETOROLAC TROMETHAMINE 5 MG/ML
1 SOLUTION OPHTHALMIC 4 TIMES DAILY
Qty: 5 ML | Refills: 0
Start: 2023-01-09

## 2023-01-09 RX ORDER — GATIFLOXACIN 5 MG/ML
1 SOLUTION/ DROPS OPHTHALMIC 2 TIMES DAILY
Qty: 3 ML | Refills: 0
Start: 2023-01-09

## 2023-01-09 RX ORDER — LIDOCAINE HYDROCHLORIDE 20 MG/ML
1 JELLY TOPICAL
Status: COMPLETED | OUTPATIENT
Start: 2023-01-09 | End: 2023-01-09

## 2023-01-09 RX ORDER — GATIFLOXACIN 5 MG/ML
SOLUTION/ DROPS OPHTHALMIC AS NEEDED
Status: DISCONTINUED | OUTPATIENT
Start: 2023-01-09 | End: 2023-01-09 | Stop reason: HOSPADM

## 2023-01-09 RX ORDER — TETRACAINE HYDROCHLORIDE 5 MG/ML
1 SOLUTION OPHTHALMIC ONCE
Status: COMPLETED | OUTPATIENT
Start: 2023-01-09 | End: 2023-01-09

## 2023-01-09 RX ORDER — BALANCED SALT SOLUTION 6.4; .75; .48; .3; 3.9; 1.7 MG/ML; MG/ML; MG/ML; MG/ML; MG/ML; MG/ML
SOLUTION OPHTHALMIC AS NEEDED
Status: DISCONTINUED | OUTPATIENT
Start: 2023-01-09 | End: 2023-01-09 | Stop reason: HOSPADM

## 2023-01-09 RX ADMIN — PHENYLEPHRINE HYDROCHLORIDE 1 DROP: 25 SOLUTION/ DROPS OPHTHALMIC at 10:30

## 2023-01-09 RX ADMIN — PROPOFOL 20 MG: 10 INJECTION, EMULSION INTRAVENOUS at 11:02

## 2023-01-09 RX ADMIN — KETOROLAC TROMETHAMINE 1 DROP: 5 SOLUTION OPHTHALMIC at 10:45

## 2023-01-09 RX ADMIN — CYCLOPENTOLATE HYDROCHLORIDE 1 DROP: 10 SOLUTION/ DROPS OPHTHALMIC at 10:00

## 2023-01-09 RX ADMIN — CYCLOPENTOLATE HYDROCHLORIDE 1 DROP: 10 SOLUTION/ DROPS OPHTHALMIC at 10:30

## 2023-01-09 RX ADMIN — CYCLOPENTOLATE HYDROCHLORIDE 1 DROP: 10 SOLUTION/ DROPS OPHTHALMIC at 10:15

## 2023-01-09 RX ADMIN — PHENYLEPHRINE HYDROCHLORIDE 1 DROP: 25 SOLUTION/ DROPS OPHTHALMIC at 10:15

## 2023-01-09 RX ADMIN — KETOROLAC TROMETHAMINE 1 DROP: 5 SOLUTION OPHTHALMIC at 10:30

## 2023-01-09 RX ADMIN — TETRACAINE HYDROCHLORIDE 1 DROP: 5 SOLUTION OPHTHALMIC at 10:00

## 2023-01-09 RX ADMIN — MIDAZOLAM 2 MG: 1 INJECTION INTRAMUSCULAR; INTRAVENOUS at 10:54

## 2023-01-09 RX ADMIN — KETOROLAC TROMETHAMINE 1 DROP: 5 SOLUTION OPHTHALMIC at 10:00

## 2023-01-09 RX ADMIN — LIDOCAINE HYDROCHLORIDE 1 APPLICATION: 20 JELLY TOPICAL at 10:16

## 2023-01-09 RX ADMIN — LIDOCAINE HYDROCHLORIDE 1 APPLICATION: 20 JELLY TOPICAL at 10:01

## 2023-01-09 RX ADMIN — CYCLOPENTOLATE HYDROCHLORIDE 1 DROP: 10 SOLUTION/ DROPS OPHTHALMIC at 10:45

## 2023-01-09 RX ADMIN — KETOROLAC TROMETHAMINE 1 DROP: 5 SOLUTION OPHTHALMIC at 10:15

## 2023-01-09 RX ADMIN — LIDOCAINE HYDROCHLORIDE 1 APPLICATION: 20 JELLY TOPICAL at 10:31

## 2023-01-09 RX ADMIN — PHENYLEPHRINE HYDROCHLORIDE 1 DROP: 25 SOLUTION/ DROPS OPHTHALMIC at 10:45

## 2023-01-09 RX ADMIN — PHENYLEPHRINE HYDROCHLORIDE 1 DROP: 25 SOLUTION/ DROPS OPHTHALMIC at 10:00

## 2023-01-09 NOTE — ANESTHESIA PREPROCEDURE EVALUATION
Procedure:  EXTRACTION EXTRACAPSULAR CATARACT PHACO INTRAOCULAR LENS (IOL) (Left: Eye)    Relevant Problems   CARDIO   (+) Pure hypercholesterolemia      /RENAL   (+) CKD (chronic kidney disease) stage 2, GFR 60-89 ml/min      GYN   (+) Malignant neoplasm of upper-outer quadrant of right breast in female, estrogen receptor positive (HCC)        Physical Exam    Airway    Mallampati score: II  TM Distance: >3 FB  Neck ROM: full     Dental   No notable dental hx     Cardiovascular  Cardiovascular exam normal    Pulmonary  Pulmonary exam normal     Other Findings        Anesthesia Plan  ASA Score- 2     Anesthesia Type- IV sedation with anesthesia with ASA Monitors  Additional Monitors:   Airway Plan:           Plan Factors-Exercise tolerance (METS): >4 METS  Chart reviewed  Imaging results reviewed  Existing labs reviewed  Patient summary reviewed  Patient is not a current smoker  Induction-     Postoperative Plan-     Informed Consent- Anesthetic plan and risks discussed with patient  I personally reviewed this patient with the CRNA  Discussed and agreed on the Anesthesia Plan with the CRNA  Juice Ramos

## 2023-01-09 NOTE — DISCHARGE INSTR - AVS FIRST PAGE
Dr Luis Armando Mclaughlin Cataract Instructions    Activity:     1  No Driving until instructed   2  Keep shield on until seen tomorrow except when administering drops   3  No heavy lifting   4  No water in eye     Diet:     1  Resume normal diet    Normal Symptoms:     1  Mild Headache   2  Scratchy or picky feeling around eye    Call the office if:     1  You have any questions or concerns   2  If eye pain is not relieved by extra strength tylenol    Office phone number:  [phone]      Next appointment:     1  See Dr Luis Armando Mclaughlin at his office tomorrow as scheduled   __________________________________________________________   2  Bring blue eye kit with you and eyedrops to the office    A new set of comprehensive instructions will be given and reviewed with you during your office visit tomorrow

## 2023-01-09 NOTE — OP NOTE
OPERATIVE REPORT    PATIENT NAME: Jordan Mak    :  1960  MRN: [id number]  Pt Location: [facility] OR ROOM 01    Surgery Date: 2023    Surgeon(s) and Role:     * Gabriela Neff MD - Primary    Posterior subcapsular polar age-related cataract, left eye [H25 042]    Post-Op Diagnosis Codes:     * Posterior subcapsular polar age-related cataract, left eye [H25 042]    Procedure(s):  EXTRACTION EXTRACAPSULAR CATARACT PHACO INTRAOCULAR LENS (IOL)    Anesthesia Type:   IV Sedation with Anesthesia    Operative Indications:  Posterior subcapsular polar age-related cataract, left eye [H25 042]  Decreased vision to 20/400  With problems driving  Pt requested cataract sx the left eye    Procedure and Technique:    Procedure Details     The patient was brought in the OR in stable condition and placed on the operative table  The left eye was prepped and draped in the usual sterile manner  Attention was directed to the left eye where a lid speculum was placed  A 2 4 mm clear corneal incision was made temperally  1/2 cc of 1% MPF Lidocaine was irrigated into the anterior chamber followed by viscoat  The side port incision was placed superiorly  The capsularrhexis was made and the nucleus was hydrodissected with BSS  The nucleus was then removed with the phaco handpiece followed by removal of the cortical material with the I/A handpiece  The capsular bag was then filled with Provisc  The IOL was folded and placed in to the capsular bag and centered well  The remaining Provisc was removed from the eye with the I/A  The wounds were hydrated with BSS and found to be water tight  The lid speculum was removed and 2 drops of Gatifloxicin were placed over the cornea  A protective eye shield was taped over the eye and the patient went to PACU in stable condition  I will see the patient in the office tomorrow and the expected post op period is a few weeks         Complications: None        Disposition: PACU   Condition: Stable    SIGNATURE: Pari Hummel MD  DATE: January 9, 2023  TIME: 11:20 AM

## 2023-04-05 ENCOUNTER — TELEPHONE (OUTPATIENT)
Dept: UROLOGY | Facility: MEDICAL CENTER | Age: 63
End: 2023-04-05

## 2023-04-05 NOTE — TELEPHONE ENCOUNTER
[facility] Clinical 2 hours ago (11:53 AM)     Garo Arthur that's fine      [address] [facility] Provider 4 hours ago (9:55 AM)     Can we even send a new script before her visit on 4/10? Hasnt been seen since 2019         Called [facility] and gave verbal to continue service  They will fax orders to sign  Confirmed with patient that order will be sent but to make sure she reestablishes care on Monday appt    Confirmed she will be coming

## 2023-04-05 NOTE — TELEPHONE ENCOUNTER
New Patient Triage    New Patient-   What is the reason for the patients appointment? Pt re-establishing care only has 11 days of catheters left  Needs new script send to "[facility] phone number is  [phone] pt spoke with an [facility]     Pt uses speedy cath 14fr female straight 3 times a day  Pt scheduled with Marcial Punchcarey on 4/10 at 8:15    Pt can be reached at [phone]          nsurance:[employer]    Do we accept the pt's insurance or is the patient Self-Pay? Provider & Plan:    Member ID#: [id number]    History  Has the pt had any previous urologist? us    Have pt records been requested?  No

## 2023-05-17 ENCOUNTER — APPOINTMENT (OUTPATIENT)
Dept: LAB | Facility: CLINIC | Age: 63
End: 2023-05-17

## 2023-05-17 DIAGNOSIS — Z17.0 MALIGNANT NEOPLASM OF UPPER-OUTER QUADRANT OF RIGHT BREAST IN FEMALE, ESTROGEN RECEPTOR POSITIVE (HCC): ICD-10-CM

## 2023-05-17 DIAGNOSIS — C50.411 MALIGNANT NEOPLASM OF UPPER-OUTER QUADRANT OF RIGHT BREAST IN FEMALE, ESTROGEN RECEPTOR POSITIVE (HCC): ICD-10-CM

## 2023-05-17 DIAGNOSIS — E78.00 PURE HYPERCHOLESTEROLEMIA: ICD-10-CM

## 2023-05-17 DIAGNOSIS — Z17.0 ESTROGEN RECEPTOR POSITIVE: ICD-10-CM

## 2023-05-17 LAB
ALBUMIN SERPL BCP-MCNC: 4.5 G/DL (ref 3.5–5)
ALP SERPL-CCNC: 47 U/L (ref 34–104)
ALT SERPL W P-5'-P-CCNC: 15 U/L (ref 7–52)
ANION GAP SERPL CALCULATED.3IONS-SCNC: 9 MMOL/L (ref 4–13)
AST SERPL W P-5'-P-CCNC: 21 U/L (ref 13–39)
BASOPHILS # BLD AUTO: 0.06 THOUSANDS/ÂΜL (ref 0–0.1)
BASOPHILS NFR BLD AUTO: 1 % (ref 0–1)
BILIRUB SERPL-MCNC: 1.31 MG/DL (ref 0.2–1)
BUN SERPL-MCNC: 16 MG/DL (ref 5–25)
CALCIUM SERPL-MCNC: 9.8 MG/DL (ref 8.4–10.2)
CHLORIDE SERPL-SCNC: 101 MMOL/L (ref 96–108)
CHOLEST SERPL-MCNC: 196 MG/DL
CO2 SERPL-SCNC: 30 MMOL/L (ref 21–32)
CREAT SERPL-MCNC: 0.78 MG/DL (ref 0.6–1.3)
EOSINOPHIL # BLD AUTO: 0.12 THOUSAND/ÂΜL (ref 0–0.61)
EOSINOPHIL NFR BLD AUTO: 2 % (ref 0–6)
ERYTHROCYTE [DISTWIDTH] IN BLOOD BY AUTOMATED COUNT: 12.8 % (ref 11.6–15.1)
GFR SERPL CREATININE-BSD FRML MDRD: 80 ML/MIN/1.73SQ M
GLUCOSE P FAST SERPL-MCNC: 100 MG/DL (ref 65–99)
HCT VFR BLD AUTO: 40.3 % (ref 34.8–46.1)
HDLC SERPL-MCNC: 82 MG/DL
HGB BLD-MCNC: 13 G/DL (ref 11.5–15.4)
IMM GRANULOCYTES # BLD AUTO: 0.03 THOUSAND/UL (ref 0–0.2)
IMM GRANULOCYTES NFR BLD AUTO: 0 % (ref 0–2)
LDLC SERPL CALC-MCNC: 71 MG/DL (ref 0–100)
LYMPHOCYTES # BLD AUTO: 1.56 THOUSANDS/ÂΜL (ref 0.6–4.47)
LYMPHOCYTES NFR BLD AUTO: 22 % (ref 14–44)
MCH RBC QN AUTO: 30.1 PG (ref 26.8–34.3)
MCHC RBC AUTO-ENTMCNC: 32.3 G/DL (ref 31.4–37.4)
MCV RBC AUTO: 93 FL (ref 82–98)
MONOCYTES # BLD AUTO: 0.63 THOUSAND/ÂΜL (ref 0.17–1.22)
MONOCYTES NFR BLD AUTO: 9 % (ref 4–12)
NEUTROPHILS # BLD AUTO: 4.8 THOUSANDS/ÂΜL (ref 1.85–7.62)
NEUTS SEG NFR BLD AUTO: 66 % (ref 43–75)
NONHDLC SERPL-MCNC: 114 MG/DL
NRBC BLD AUTO-RTO: 0 /100 WBCS
PLATELET # BLD AUTO: 306 THOUSANDS/UL (ref 149–390)
PMV BLD AUTO: 10.7 FL (ref 8.9–12.7)
POTASSIUM SERPL-SCNC: 4 MMOL/L (ref 3.5–5.3)
PROT SERPL-MCNC: 7.1 G/DL (ref 6.4–8.4)
RBC # BLD AUTO: 4.32 MILLION/UL (ref 3.81–5.12)
SODIUM SERPL-SCNC: 140 MMOL/L (ref 135–147)
TRIGL SERPL-MCNC: 216 MG/DL
TSH SERPL DL<=0.05 MIU/L-ACNC: 3.42 UIU/ML (ref 0.45–4.5)
WBC # BLD AUTO: 7.2 THOUSAND/UL (ref 4.31–10.16)

## 2023-06-21 ENCOUNTER — OFFICE VISIT (OUTPATIENT)
Dept: FAMILY MEDICINE CLINIC | Facility: CLINIC | Age: 63
End: 2023-06-21
Payer: COMMERCIAL

## 2023-06-21 VITALS
BODY MASS INDEX: 22.73 KG/M2 | OXYGEN SATURATION: 97 % | RESPIRATION RATE: 18 BRPM | HEART RATE: 93 BPM | SYSTOLIC BLOOD PRESSURE: 126 MMHG | TEMPERATURE: 97.3 F | HEIGHT: 66 IN | DIASTOLIC BLOOD PRESSURE: 70 MMHG | WEIGHT: 141.4 LBS

## 2023-06-21 DIAGNOSIS — Z13.1 SCREENING FOR DIABETES MELLITUS: ICD-10-CM

## 2023-06-21 DIAGNOSIS — Z13.0 SCREENING, DEFICIENCY ANEMIA, IRON: ICD-10-CM

## 2023-06-21 DIAGNOSIS — C50.411 MALIGNANT NEOPLASM OF UPPER-OUTER QUADRANT OF RIGHT BREAST IN FEMALE, ESTROGEN RECEPTOR POSITIVE (HCC): ICD-10-CM

## 2023-06-21 DIAGNOSIS — Z17.0 MALIGNANT NEOPLASM OF UPPER-OUTER QUADRANT OF RIGHT BREAST IN FEMALE, ESTROGEN RECEPTOR POSITIVE (HCC): ICD-10-CM

## 2023-06-21 DIAGNOSIS — Z00.00 HEALTH CARE MAINTENANCE: Primary | ICD-10-CM

## 2023-06-21 DIAGNOSIS — Z13.29 SCREENING FOR THYROID DISORDER: ICD-10-CM

## 2023-06-21 DIAGNOSIS — R73.01 ELEVATED FASTING BLOOD SUGAR: ICD-10-CM

## 2023-06-21 DIAGNOSIS — E78.00 PURE HYPERCHOLESTEROLEMIA: ICD-10-CM

## 2023-06-21 DIAGNOSIS — Z79.899 DRUG THERAPY: ICD-10-CM

## 2023-06-21 PROCEDURE — 99396 PREV VISIT EST AGE 40-64: CPT | Performed by: NURSE PRACTITIONER

## 2023-06-21 NOTE — PROGRESS NOTES
FAMILY PRACTICE HEALTH MAINTENANCE OFFICE VISIT  [facility]IMARY [facility]ARE [facility]KK[facility]    NAME: Chuckie Pelletier  AGE: 58 y o  SEX: female  : 1960     DATE: 2023    Assessment and Plan     1  Health care maintenance    2  Pure hypercholesterolemia  -     Lipid panel; Future  -     TSH, 3rd generation with Free T4 reflex; Future    3  Elevated fasting blood sugar  -     Hemoglobin A1C; Future    4  Screening, deficiency anemia, iron  -     CBC and differential; Future    5  Screening for thyroid disorder  -     TSH, 3rd generation with Free T4 reflex; Future    6  Screening for diabetes mellitus  -     Comprehensive metabolic panel; Future  -     Hemoglobin A1C; Future    7  Drug therapy  -     Lipid panel; Future  -     CBC and differential; Future  -     TSH, 3rd generation with Free T4 reflex; Future  -     Comprehensive metabolic panel; Future  -     Hemoglobin A1C; Future    8  Malignant neoplasm of upper-outer quadrant of right breast in female, estrogen receptor positive ([facility] [address] )    Patient clinically stable at this time  Cont with current plan of care  Monitor cystic lesion left hand  Likely ganglion cyst  If persists consider xr, hand surgeon  RTO as recommended and PRN      · Patient Counseling:   · Nutrition: Stressed importance of a well balanced diet, moderation of sodium/saturated fat, caloric balance and sufficient intake of fiber  · Exercise: Stressed the importance of regular exercise with a goal of 150 minutes per week  · Dental Health: Discussed daily flossing and brushing and regular dental visits   · Injury Prevention: Discussed Safety Belts, Safety Helmets, and Smoke Detectors    · Immunizations reviewed: Up To Date  · Discussed benefits of:  Cervical Cancer screening  • BMI Counseling: Body mass index is 22 54 kg/m²  Discussed with patient's BMI with her  Return in about 1 year (around 2024)          Chief Complaint     Chief Complaint   Patient presents with   • Hand Problem     Patient has a bump on her right hand - no pain    • Establish Care     Routine check up        History of Present Illness     HPI    Well Adult Physical   Patient here for a comprehensive physical exam       Diet and Physical Activity  Diet: well balanced diet  Exercise: weekly      Depression Screen  PHQ-2/9 Depression Screening    Little interest or pleasure in doing things: 0 - not at all  Feeling down, depressed, or hopeless: 0 - not at all  Trouble falling or staying asleep, or sleeping too much: 0 - not at all  Feeling tired or having little energy: 0 - not at all  Poor appetite or overeatin - not at all  Feeling bad about yourself - or that you are a failure or have let yourself or your family down: 0 - not at all  Trouble concentrating on things, such as reading the newspaper or watching television: 0 - not at all  Moving or speaking so slowly that other people could have noticed  Or the opposite - being so fidgety or restless that you have been moving around a lot more than usual: 0 - not at all  Thoughts that you would be better off dead, or of hurting yourself in some way: 0 - not at all  PHQ-2 Score: 0  PHQ-2 Interpretation: Negative depression screen  PHQ-9 Score: 0   PHQ-9 Interpretation: No or Minimal depression           General Health  Hearing: Normal:  bilateral  Vision: no vision problems  Dental: regular dental visits    Reproductive Health  Follows with gynecologist and s/p hysterectomy      The following portions of the patient's history were reviewed and updated as appropriate: allergies, current medications, past family history, past medical history, past social history, past surgical history and problem list     Review of Systems     Review of Systems   Constitutional: Negative for fatigue, fever and unexpected weight change  HENT: Negative for trouble swallowing  Respiratory: Negative for cough and shortness of breath      Cardiovascular: Negative for chest pain, palpitations and leg swelling  Gastrointestinal: Negative for abdominal pain and blood in stool  Endocrine: Negative  Genitourinary: Negative for difficulty urinating and hematuria  Musculoskeletal:        Per hpi   Skin: Negative for pallor  Neurological: Negative for dizziness, tremors, seizures, syncope and weakness  Hematological: Does not bruise/bleed easily  Psychiatric/Behavioral: Negative for confusion and dysphoric mood  The patient is not nervous/anxious  Past Medical History     Past Medical History:   Diagnosis Date   • Breast cancer ([facility] [place] [address] )     Stage 1   • Cancer [facility]) 2017   • Cervical cancer [facility])    • Hyperlipidemia    • Urinary tract infection        Past Surgical History     Past Surgical History:   Procedure Laterality Date   • BREAST LUMPECTOMY Right 2017   • BREAST LUMPECTOMY Right    • BREAST LUMPECTOMY      malignant- s/p radiation   •  SECTION     • COLONOSCOPY  2019   • CYSTOSCOPY  2019   • HYSTERECTOMY     • LYMPH NODE BIOPSY     • PARTIAL HYSTERECTOMY      uterus only   • CT XCAPSL CTRC RMVL INSJ IO LENS PROSTH W/O ECP Left 2023    Procedure: EXTRACTION EXTRACAPSULAR CATARACT PHACO INTRAOCULAR LENS (IOL); Surgeon: Brianda Hogue MD;  Location: [facility] MAIN OR;  Service: Ophthalmology       Social History     Social History     Socioeconomic History   • Marital status: /Civil Union     Spouse name: None   • Number of children: None   • Years of education: None   • Highest education level: None   Occupational History   • None   Tobacco Use   • Smoking status: Former     Packs/day: 0 50     Years: 23 00     Total pack years: 11 50     Types: Cigarettes     Start date: 1981     Quit date: 2004     Years since quittin 0   • Smokeless tobacco: Never   Vaping Use   • Vaping Use: Never used   Substance and Sexual Activity   • Alcohol use:  Yes     Alcohol/week: 3 0 standard drinks of alcohol Types: 3 Standard drinks or equivalent per week     Comment: Alcohol intake:   Occasional  - As per Rey Araiza    • Drug use: No   • Sexual activity: Not Currently     Partners: Male   Other Topics Concern   • None   Social History Narrative    · Most recent tobacco use screenin2019      · Exercise level:   Occasional      · Diet:   Regular      · General stress level:   Low      · Caffeine intake: Moderate      · Seat belts used routinely:   Yes      · Smoke alarm in home: Yes      · Advance directive:   No      · Live alone or with others:   with others      Social Determinants of Health     Financial Resource Strain: Low Risk  (2020)    Overall Financial Resource Strain (CARDIA)    • Difficulty of Paying Living Expenses: Not hard at all   Food Insecurity: No Food Insecurity (2020)    Hunger Vital Sign    • Worried About Running Out of Food in the Last Year: Never true    • Ran Out of Food in the Last Year: Never true   Transportation Needs: Unknown (2020)    PRAPARE - Transportation    • Lack of Transportation (Medical): No    • Lack of Transportation (Non-Medical): Not on file   Physical Activity: Insufficiently Active (2020)    Exercise Vital Sign    • Days of Exercise per Week: 2 days    • Minutes of Exercise per Session: 20 min   Stress: No Stress Concern Present (2020)    [address]    • Feeling of Stress : Not at all   Social Connections: Unknown (2020)    Social Connection and Isolation Panel [NHANES]    • Frequency of Communication with Friends and Family: More than three times a week    • Frequency of Social Gatherings with Friends and Family:  Three times a week    • Attends Protestant Services: More than 4 times per year    • Active Member of Clubs or Organizations: No    • Attends Club or Organization Meetings: Never    • Marital Status: Not on file   Intimate Partner Violence: Not At Risk "(11/9/2020)    Humiliation, Afraid, Rape, and Kick questionnaire    • Fear of Current or Ex-Partner: No    • Emotionally Abused: No    • Physically Abused: No    • Sexually Abused: No   Housing Stability: Not on file       Family History     Family History   Problem Relation Age of Onset   • Lung cancer Mother    • Cancer Maternal Aunt    • Breast cancer Maternal Aunt        Current Medications       Current Outpatient Medications:   •  Ascorbic Acid (VITAMIN C) 500 MG CAPS, Take by mouth, Disp: , Rfl:   •  aspirin 81 MG tablet, Take 81 mg by mouth, Disp: , Rfl:   •  atorvastatin (LIPITOR) 20 mg tablet, TAKE 1 TABLET BY MOUTH DAILY AT BEDTIME, Disp: 90 tablet, Rfl: 3  •  Calcium Carb-Cholecalciferol (CALCIUM CARBONATE-VITAMIN D3 PO), Take by mouth, Disp: , Rfl:   •  Cholecalciferol (VITAMIN D3) 5000 units CAPS, Take by mouth, Disp: , Rfl:   •  Cranberry 1000 MG CAPS, Take 4,200 mg by mouth, Disp: , Rfl:   •  letrozole (FEMARA) 2 5 mg tablet, Take 2 5 mg by mouth daily, Disp: , Rfl:   •  MEGARED OMEGA-3 KRILL OIL PO, Take by mouth, Disp: , Rfl:   •  Vitamin E 400 units TABS, Take 400 Units by mouth, Disp: , Rfl:      Allergies     No Known Allergies    Objective     /70 (BP Location: Right arm, Patient Position: Sitting, Cuff Size: Adult)   Pulse 93   Temp (!) 97 3 °F (36 3 °C) (Temporal)   Resp 18   Ht 5' 6 42\" (1 687 m)   Wt 64 1 kg (141 lb 6 4 oz)   SpO2 97%   BMI 22 54 kg/m²      Physical Exam  Vitals and nursing note reviewed  Constitutional:       General: She is not in acute distress  Appearance: Normal appearance  HENT:      Head: Normocephalic and atraumatic  Neck:      Vascular: No carotid bruit  Cardiovascular:      Rate and Rhythm: Normal rate and regular rhythm  No extrasystoles are present  Pulses: Normal pulses  Heart sounds: Normal heart sounds  No gallop  No S3 or S4 sounds     Pulmonary:      Effort: Pulmonary effort is normal       Breath sounds: Normal breath " sounds  Abdominal:      Palpations: Abdomen is soft  Tenderness: There is no abdominal tenderness  Musculoskeletal:        Arms:       Right lower leg: No edema  Left lower leg: No edema  Lymphadenopathy:      Cervical: No cervical adenopathy  Skin:     Coloration: Skin is not pale  Neurological:      General: No focal deficit present  Mental Status: She is alert  Motor: No weakness  Gait: Gait normal    Psychiatric:         Mood and Affect: Mood normal            No results found          NAYELI Asif  [facility]

## 2023-06-22 ENCOUNTER — TELEPHONE (OUTPATIENT)
Dept: ADMINISTRATIVE | Facility: OTHER | Age: 63
End: 2023-06-22

## 2023-06-22 NOTE — TELEPHONE ENCOUNTER
Upon review of the In Basket request we were able to locate, review, and update the patient chart as requested for DEXA Scan  Any additional questions or concerns should be emailed to the Practice Liaisons via the appropriate education email address, please do not reply via In Basket      Thank you  Xavier Ho

## 2023-06-22 NOTE — TELEPHONE ENCOUNTER
Upon review of the In Basket request we were able to locate, review, and update the patient chart as requested for CRC: Colonoscopy  Any additional questions or concerns should be emailed to the Practice Liaisons via the appropriate education email address, please do not reply via In Basket      Thank you  Sravani Games

## 2023-06-22 NOTE — TELEPHONE ENCOUNTER
----- Message from Celeste Alejo, [address] sent at 6/21/2023  4:37 PM EDT -----  06/21/23 4:37 PM    Hello, our patient Hao Caballero has had CRC: Colonoscopy completed/performed  Please assist in updating the patient chart by making an External outreach to [place] [address] facility located in [facility]  The date of service is 08/03/2019      Thank you,  NAYELI Asif  [facility] [address] [place] [address]  \

## 2023-06-22 NOTE — TELEPHONE ENCOUNTER
----- Message from Ronnie Mcqueen sent at 6/21/2023  4:35 PM EDT -----  06/21/23 4:36 PM    Hello, our patient [place] has had DEXA Scan completed/performed  Please assist in updating the patient chart by pulling the document from the Imaging/Procedure Tab  The date of service is 7/2022       Thank you,  NAYELI Mcqueen  [facility] [address] [place] [address]

## 2023-10-03 ENCOUNTER — HOSPITAL ENCOUNTER (OUTPATIENT)
Dept: ULTRASOUND IMAGING | Facility: HOSPITAL | Age: 63
Discharge: HOME | End: 2023-10-03
Payer: COMMERCIAL

## 2023-10-03 DIAGNOSIS — N31.9 NEUROGENIC BLADDER: ICD-10-CM

## 2023-10-03 PROCEDURE — 76770 US EXAM ABDO BACK WALL COMP: CPT

## 2023-10-10 ENCOUNTER — OFFICE VISIT (OUTPATIENT)
Dept: UROLOGY | Facility: CLINIC | Age: 63
End: 2023-10-10
Payer: COMMERCIAL

## 2023-10-10 VITALS
SYSTOLIC BLOOD PRESSURE: 120 MMHG | HEART RATE: 90 BPM | BODY MASS INDEX: 22.15 KG/M2 | OXYGEN SATURATION: 99 % | WEIGHT: 139 LBS | DIASTOLIC BLOOD PRESSURE: 70 MMHG

## 2023-10-10 DIAGNOSIS — N31.9 NEUROGENIC BLADDER: Primary | ICD-10-CM

## 2023-10-10 PROCEDURE — 99213 OFFICE O/P EST LOW 20 MIN: CPT | Performed by: PHYSICIAN ASSISTANT

## 2023-10-10 NOTE — PROGRESS NOTES
10/10/2023    Selin Arenas  1960  [id number]        Assessment  Neurogenic bladder     Plan  She has been doing well with her routine of clean intermittent catheterization 3 times daily. She will continue this, does not need catheter refills at this time. She will call as needed for concerns for hematuria or infection. Proper hydration advised. We will update an ultrasound and BMP with follow-up 1 year         History of Present Illness  Elke Linares is a 58 y.o. female patient previously under the care of Dr. Kaycee Emanuel with history of neurogenic bladder. Patient performes self catheterization 3 times daily. She has history of hysterectomy and radiation therapy to the pelvis for cervical cancer. She underwent a few years ago cystoscopy with signs of hypervascularity from prior radiation. 1 point was having recurrent urinary infections, however fortunately over the past 2 years has been infection free. Previous CT () scan revealed no significant urinary tract pathology. Renal US (10/2023) mild right hydronephrosis and left renal pelvic fullness. Moderate urinary residual 197 mL,. 7mm right intrarenal stone. Review of Systems  Review of Systems   Constitutional: Negative. HENT: Negative. Respiratory: Negative. Cardiovascular: Negative. Gastrointestinal: Negative. Genitourinary:         As per HPI   Musculoskeletal: Negative. Skin: Negative. Neurological: Negative. Hematological: Negative.           Past Medical History  Past Medical History:   Diagnosis Date    Breast cancer ([address])     Stage 1    Cancer [facility]) 2017    Cervical cancer [facility])     Hyperlipidemia     Urinary tract infection        Past Social History  Past Surgical History:   Procedure Laterality Date    BREAST LUMPECTOMY Right 2017    BREAST LUMPECTOMY Right 2018    BREAST LUMPECTOMY      malignant- s/p radiation     SECTION      COLONOSCOPY  2019    CYSTOSCOPY 2019    HYSTERECTOMY      LYMPH NODE BIOPSY      PARTIAL HYSTERECTOMY  1989    uterus only    [place] XCAPSL CTRC RMVL INSJ IO LENS PROSTH W/O ECP Left 2023    Procedure: EXTRACTION EXTRACAPSULAR CATARACT PHACO INTRAOCULAR LENS (IOL); Surgeon: Indu Howard MD;  Location: [facility] MAIN OR;  Service: Ophthalmology       Past Family History  Family History   Problem Relation Age of Onset    Lung cancer Mother     Cancer Maternal Aunt     Breast cancer Maternal Aunt        Past Social history  Social History     Socioeconomic History    Marital status: /Civil Union     Spouse name: Not on file    Number of children: Not on file    Years of education: Not on file    Highest education level: Not on file   Occupational History    Not on file   Tobacco Use    Smoking status: Former     Packs/day: 0.50     Years: 23.00     Total pack years: 11.50     Types: Cigarettes     Start date: 1981     Quit date: 2004     Years since quittin.4    Smokeless tobacco: Never   Vaping Use    Vaping Use: Never used   Substance and Sexual Activity    Alcohol use: Yes     Alcohol/week: 3.0 standard drinks of alcohol     Types: 3 Standard drinks or equivalent per week     Comment: Alcohol intake:   Occasional  - As per Thien Im     Drug use: No    Sexual activity: Not Currently     Partners: Male   Other Topics Concern    Not on file   Social History Narrative    · Most recent tobacco use screenin2019      · Exercise level:   Occasional      · Diet:   Regular      · General stress level:   Low      · Caffeine intake: Moderate      · Seat belts used routinely:   Yes      · Smoke alarm in home:    Yes      · Advance directive:   No      · Live alone or with others:   with others      Social Determinants of Health     Financial Resource Strain: Low Risk  (2020)    Overall Financial Resource Strain (CARDIA)     Difficulty of Paying Living Expenses: Not hard at all   Food Insecurity: No Food Insecurity (2020)    Hunger Vital Sign     Worried About Running Out of Food in the Last Year: Never true     [address] in the Last Year: Never true   Transportation Needs: Unknown (2020)    PRAPARE - Transportation     Lack of Transportation (Medical): No     Lack of Transportation (Non-Medical): Not on file   Physical Activity: Insufficiently Active (2020)    Exercise Vital Sign     Days of Exercise per Week: 2 days     Minutes of Exercise per Session: 20 min   Stress: No Stress Concern Present (2020)    [address] [facility]     Feeling of Stress : Not at all   Social Connections: Unknown (2020)    Social Connection and Isolation Panel [NHANES]     Frequency of Communication with Friends and Family: More than three times a week     Frequency of Social Gatherings with Friends and Family:  Three times a week     Attends Mandaen Services: More than 4 times per year     Active Member of Clubs or Organizations: No     Attends Club or Organization Meetings: Never     Marital Status: Not on file   Intimate Partner Violence: Not At Risk (2020)    Humiliation, Afraid, Rape, and Kick questionnaire     Fear of Current or Ex-Partner: No     Emotionally Abused: No     Physically Abused: No     Sexually Abused: No   Housing Stability: Not on file     Social History     Tobacco Use   Smoking Status Former    Packs/day: 0.50    Years: 23.00    Total pack years: 11.50    Types: Cigarettes    Start date: 1981    Quit date: 2004    Years since quittin.4   Smokeless Tobacco Never       Current Medications  Current Outpatient Medications   Medication Sig Dispense Refill    Ascorbic Acid (VITAMIN C) 500 MG CAPS Take by mouth      aspirin 81 MG tablet Take 81 mg by mouth      atorvastatin (LIPITOR) 20 mg tablet TAKE 1 TABLET BY MOUTH DAILY AT BEDTIME 90 tablet 3    Calcium Carb-Cholecalciferol (CALCIUM CARBONATE-VITAMIN D3 PO) Take by mouth Cholecalciferol (VITAMIN D3) 5000 units CAPS Take by mouth      Cranberry 1000 MG CAPS Take 4,200 mg by mouth      letrozole (FEMARA) 2.5 mg tablet Take 2.5 mg by mouth daily      MEGARED OMEGA-3 KRILL OIL PO Take by mouth      Vitamin E 400 units TABS Take 400 Units by mouth       No current facility-administered medications for this visit. Allergies  No Known Allergies    Past Medical History, Social History, Family History, medications and allergies were reviewed. Vitals  Vitals:    10/10/23 1506   BP: 120/70   BP Location: Left arm   Patient Position: Sitting   Cuff Size: Adult   Pulse: 90   SpO2: 99%   Weight: 63 kg (139 lb)       Physical Exam  Physical Exam  Vitals reviewed. Constitutional:       Appearance: She is well-developed. Cardiovascular:      Rate and Rhythm: Normal rate. Pulmonary:      Effort: Pulmonary effort is normal.   Abdominal:      Palpations: Abdomen is soft. Genitourinary:     Comments: No CVA tenderness  Musculoskeletal:         General: Normal range of motion. Skin:     General: Skin is warm and dry. Neurological:      Mental Status: She is alert and oriented to person, place, and time.            Results  No results found for: "PSA"  Lab Results   Component Value Date    GLUCOSE 92 10/22/2015    CALCIUM 9.8 05/17/2023     10/22/2015    K 4.0 05/17/2023    CO2 30 05/17/2023     05/17/2023    BUN 16 05/17/2023    CREATININE 0.78 05/17/2023     Lab Results   Component Value Date    WBC 7.20 05/17/2023    HGB 13.0 05/17/2023    HCT 40.3 05/17/2023    MCV 93 05/17/2023     05/17/2023

## 2023-11-10 ENCOUNTER — APPOINTMENT (OUTPATIENT)
Dept: LAB | Facility: CLINIC | Age: 63
End: 2023-11-10
Payer: COMMERCIAL

## 2023-11-10 DIAGNOSIS — Z13.1 SCREENING FOR DIABETES MELLITUS: ICD-10-CM

## 2023-11-10 DIAGNOSIS — Z79.811 USE OF LETROZOLE (FEMARA): ICD-10-CM

## 2023-11-10 DIAGNOSIS — N31.9 NEUROGENIC BLADDER: ICD-10-CM

## 2023-11-10 DIAGNOSIS — R73.01 ELEVATED FASTING BLOOD SUGAR: ICD-10-CM

## 2023-11-10 DIAGNOSIS — E78.00 PURE HYPERCHOLESTEROLEMIA: ICD-10-CM

## 2023-11-10 DIAGNOSIS — Z13.0 SCREENING, DEFICIENCY ANEMIA, IRON: ICD-10-CM

## 2023-11-10 DIAGNOSIS — Z79.899 DRUG THERAPY: ICD-10-CM

## 2023-11-10 DIAGNOSIS — Z13.29 SCREENING FOR THYROID DISORDER: ICD-10-CM

## 2023-11-10 LAB
ALBUMIN SERPL BCP-MCNC: 4.4 G/DL (ref 3.5–5)
ALP SERPL-CCNC: 52 U/L (ref 34–104)
ALT SERPL W P-5'-P-CCNC: 18 U/L (ref 7–52)
ANION GAP SERPL CALCULATED.3IONS-SCNC: 7 MMOL/L
AST SERPL W P-5'-P-CCNC: 24 U/L (ref 13–39)
BILIRUB SERPL-MCNC: 1.05 MG/DL (ref 0.2–1)
BUN SERPL-MCNC: 15 MG/DL (ref 5–25)
CALCIUM SERPL-MCNC: 9.5 MG/DL (ref 8.4–10.2)
CHLORIDE SERPL-SCNC: 103 MMOL/L (ref 96–108)
CO2 SERPL-SCNC: 29 MMOL/L (ref 21–32)
CREAT SERPL-MCNC: 0.9 MG/DL (ref 0.6–1.3)
GFR SERPL CREATININE-BSD FRML MDRD: 67 ML/MIN/1.73SQ M
GLUCOSE P FAST SERPL-MCNC: 105 MG/DL (ref 65–99)
POTASSIUM SERPL-SCNC: 3.6 MMOL/L (ref 3.5–5.3)
PROT SERPL-MCNC: 6.8 G/DL (ref 6.4–8.4)
SODIUM SERPL-SCNC: 139 MMOL/L (ref 135–147)

## 2023-11-10 PROCEDURE — 80053 COMPREHEN METABOLIC PANEL: CPT

## 2023-11-10 PROCEDURE — 36415 COLL VENOUS BLD VENIPUNCTURE: CPT

## 2023-11-28 ENCOUNTER — OFFICE VISIT (OUTPATIENT)
Dept: FAMILY MEDICINE CLINIC | Facility: CLINIC | Age: 63
End: 2023-11-28
Payer: COMMERCIAL

## 2023-11-28 VITALS
HEIGHT: 66 IN | SYSTOLIC BLOOD PRESSURE: 136 MMHG | DIASTOLIC BLOOD PRESSURE: 82 MMHG | TEMPERATURE: 97.7 F | HEART RATE: 97 BPM | WEIGHT: 138.8 LBS | BODY MASS INDEX: 22.31 KG/M2 | OXYGEN SATURATION: 97 %

## 2023-11-28 DIAGNOSIS — M54.31 SCIATICA OF RIGHT SIDE: Primary | ICD-10-CM

## 2023-11-28 PROCEDURE — 99213 OFFICE O/P EST LOW 20 MIN: CPT | Performed by: FAMILY MEDICINE

## 2023-11-28 RX ORDER — CYCLOBENZAPRINE HCL 5 MG
5 TABLET ORAL 3 TIMES DAILY PRN
Qty: 45 TABLET | Refills: 0 | Status: SHIPPED | OUTPATIENT
Start: 2023-11-28 | End: 2023-12-06

## 2023-11-29 NOTE — PROGRESS NOTES
Assessment/Plan:  As  below  She  is  Sidra's  patient will f/u  with her if no improvement      Problem List Items Addressed This Visit          Nervous and Auditory    Sciatica of right side - Primary     Started  a few  days  ago    no  swelling or  local tenderness pain  radiates  to rt  thigh  from  the  middle  of  the hip   discussed  precautions  and  care   she  has h/o CKD  so  advised  her   not to  take  NSAIDS   take  tylenol bid  and  started on  flexeril  to  take  q  hs  or  bid if  not  driving    I offered  to  reff  her  for  PT  she  declined   wants to  wait   will  f/u  return  parameters  discussed          Relevant Medications    cyclobenzaprine (FLEXERIL) 5 mg tablet         Subjective:      Patient ID: Felicia Gifford is a 58 y.o. female. Hip Pain     - came  in  for  the  acute  visit  having rt  sided   sciatica  pain  started  a few  days ago  pain  starts  from  the center of rt hip  radiates  to  rt  thigh  no  other  complaints  currently    The following portions of the patient's history were reviewed and updated as appropriate:   Past Medical History:  She has a past medical history of Breast cancer ([address] [facility]) (), Cancer [facility]) (2017), Cervical cancer ([address] ) (), Hyperlipidemia, and Urinary tract infection. ,  _______________________________________________________________________  Medical Problems:  does not have any pertinent problems on file.,  _______________________________________________________________________  Past Surgical History:   has a past surgical history that includes Partial hysterectomy (); Breast lumpectomy (Right, 2017); Breast lumpectomy (Right, 2018); Cystoscopy (2019);  section (); Breast lumpectomy; Colonoscopy (2019); Lymph node biopsy; pr xcapsl ctrc rmvl insj io lens prosth w/o ecp (Left, 2023); and Hysterectomy. ,  _______________________________________________________________________  [facility] History:  family history includes Breast cancer in her maternal aunt; Cancer in her maternal aunt; Lung cancer in her mother.,  _______________________________________________________________________  Social History:   reports that she quit smoking about 19 years ago. Her smoking use included cigarettes. She started smoking about 42 years ago. She has a 11.50 pack-year smoking history. She has never used smokeless tobacco. She reports current alcohol use of about 3.0 standard drinks of alcohol per week. She reports that she does not use drugs. ,  _______________________________________________________________________  Allergies:  has No Known Allergies. .  _______________________________________________________________________  Current Outpatient Medications   Medication Sig Dispense Refill    Ascorbic Acid (VITAMIN C) 500 MG CAPS Take by mouth      aspirin 81 MG tablet Take 81 mg by mouth      atorvastatin (LIPITOR) 20 mg tablet TAKE 1 TABLET BY MOUTH DAILY AT BEDTIME 90 tablet 3    Calcium Carb-Cholecalciferol (CALCIUM CARBONATE-VITAMIN D3 PO) Take by mouth      Cholecalciferol (VITAMIN D3) 5000 units CAPS Take by mouth      Cranberry 1000 MG CAPS Take 4,200 mg by mouth      cyclobenzaprine (FLEXERIL) 5 mg tablet Take 1 tablet (5 mg total) by mouth 3 (three) times a day as needed for muscle spasms for up to 15 days 45 tablet 0    letrozole (FEMARA) 2.5 mg tablet Take 2.5 mg by mouth daily      MEGARED OMEGA-3 KRILL OIL PO Take by mouth      Vitamin E 400 units TABS Take 400 Units by mouth       No current facility-administered medications for this visit.     _______________________________________________________________________  Review of Systems   Constitutional:  Negative for fatigue and fever. HENT:  Negative for congestion and postnasal drip. Eyes:  Negative for photophobia, discharge and redness. Respiratory:  Negative for cough and shortness of breath.     Cardiovascular:  Negative for chest pain, palpitations and leg swelling. Gastrointestinal:  Negative for abdominal distention and abdominal pain. Endocrine: Negative for cold intolerance, heat intolerance and polydipsia. Genitourinary:  Negative for dysuria and flank pain. Musculoskeletal:  Positive for arthralgias. Sciatica   Neurological:  Negative for dizziness and headaches. Psychiatric/Behavioral:  The patient is not nervous/anxious. Objective:  Vitals:    11/28/23 1924   BP: 136/82   BP Location: Left arm   Patient Position: Sitting   Cuff Size: Standard   Pulse: 97   Temp: 97.7 °F (36.5 °C)   TempSrc: Temporal   SpO2: 97%   Weight: 63 kg (138 lb 12.8 oz)   Height: 5' 6.42" (1.687 m)     Body mass index is 22.12 kg/m². Physical Exam  Vitals and nursing note reviewed. Constitutional:       General: She is not in acute distress. Appearance: Normal appearance. She is not ill-appearing, toxic-appearing or diaphoretic. HENT:      Head: Normocephalic. Nose: Nose normal. No congestion. Mouth/Throat:      Mouth: Mucous membranes are moist.   Eyes:      Extraocular Movements: Extraocular movements intact. Pupils: Pupils are equal, round, and reactive to light. Cardiovascular:      Rate and Rhythm: Normal rate and regular rhythm. Pulses: Normal pulses. Heart sounds: Normal heart sounds. No murmur heard. No gallop. Pulmonary:      Effort: Pulmonary effort is normal.      Breath sounds: Normal breath sounds. Abdominal:      General: There is no distension. Palpations: Abdomen is soft. There is no mass. Tenderness: There is no abdominal tenderness. Hernia: No hernia is present. Musculoskeletal:         General: No swelling or deformity. Cervical back: Normal range of motion and neck supple. No rigidity. Right lower leg: No edema. Left lower leg: No edema. Skin:     Findings: No erythema or rash. Neurological:      General: No focal deficit present. Mental Status: She is alert and oriented to person, place, and time.    Psychiatric:         Mood and Affect: Mood normal.         Behavior: Behavior normal.

## 2023-11-29 NOTE — ASSESSMENT & PLAN NOTE
Started  a few  days  ago    no  swelling or  local tenderness pain  radiates  to rt  thigh  from  the  middle  of  the hip   discussed  precautions  and  care   she  has h/o CKD  so  advised  her   not to  take  NSAIDS   take  tylenol bid  and  started on  flexeril  to  take  q  hs  or  bid if  not  driving    I offered  to  reff  her  for  PT  she  declined   wants to  wait   will  f/u  return  parameters  discussed

## 2023-12-06 DIAGNOSIS — M54.31 SCIATICA OF RIGHT SIDE: ICD-10-CM

## 2023-12-06 RX ORDER — CYCLOBENZAPRINE HCL 5 MG
5 TABLET ORAL 3 TIMES DAILY PRN
Qty: 270 TABLET | Refills: 1 | Status: SHIPPED | OUTPATIENT
Start: 2023-12-06 | End: 2024-06-03

## 2023-12-11 ENCOUNTER — EVALUATION (OUTPATIENT)
Dept: PHYSICAL THERAPY | Facility: REHABILITATION | Age: 63
End: 2023-12-11
Payer: COMMERCIAL

## 2023-12-11 DIAGNOSIS — M54.31 SCIATICA OF RIGHT SIDE: ICD-10-CM

## 2023-12-11 PROCEDURE — 97110 THERAPEUTIC EXERCISES: CPT | Performed by: PHYSICAL THERAPIST

## 2023-12-11 PROCEDURE — 97161 PT EVAL LOW COMPLEX 20 MIN: CPT | Performed by: PHYSICAL THERAPIST

## 2023-12-11 NOTE — PROGRESS NOTES
PT Evaluation     Today's date: 2023  Patient name: Sammi Pugh  : 1960  MRN: [id number]  Referring provider: NAYELI Atkinson  Dx:   Encounter Diagnosis     ICD-10-CM    1. Sciatica of right side  M54.31 Ambulatory Referral to Physical Therapy          Start Time: 934          Assessment  Assessment details: Sammi Pugh is a 58 y.o. female present with:   Sciatica of right side    Paula Ba has the above listed impairments and will benefit from skilled Physical Therapist management to improve deficits to return to prior level of function. Impairments: abnormal muscle firing, abnormal or restricted ROM, activity intolerance, impaired physical strength, lacks appropriate home exercise program and pain with function    Symptom irritability: lowBarriers to therapy: Cost of care- high deductible/co-insurance  Understanding of Dx/Px/POC: good   Prognosis: good    Goals  Impairment Goals  - Decrease pain 0/10  - Increase strength to 5/5 throughout    Functional Goals  - Return to Prior Level of Function  - Increase Functional Status Measure to: 80  - Patient will be independent with HEP  -Patient will be able to return to activities of daily living without pain    Plan  Patient would benefit from: skilled PT  Planned therapy interventions: joint mobilization, manual therapy, patient education, postural training, activity modification, abdominal trunk stabilization, body mechanics training, flexibility, functional ROM exercises, graded exercise, home exercise program, neuromuscular re-education, strengthening, stretching, therapeutic activities and therapeutic exercise  Frequency: 2x week  Duration in weeks: 6  Plan of Care beginning date: 2023  Plan of Care expiration date: 2024  Treatment plan discussed with: patient        Subjective Evaluation    History of Present Illness  Mechanism of injury: Paula Ba reports pain starting on 11/15/23.  Notes the pain started within buttock and would radiate into the right lateral aspect of leg sometimes down to her foot. Currently pain is most pronounced with prolonged sitting, reduces as she's up and moving. Works as  which requires her to sit extended periods of time. Recurrent probem    Patient Goals  Patient goals for therapy: decreased pain, increased strength and increased motion  Patient goal: Return to walking, be able to function  Pain  Current pain ratin  At worst pain ratin  Location: R Buttock  Quality: tight, sharp and dull ache  Progression: no change    Social Support    Employment status: working ()        Objective     Concurrent Complaints  Positive for bladder dysfunction (uses catheter related to seperate dx). Negative for night pain, disturbed sleep, bowel dysfunction, saddle (S4) numbness and history of trauma    Palpation     Right   Muscle spasm in the piriformis. Tenderness of the piriformis. Active Range of Motion     Lumbar   Flexion:  WFL  Extension:  WFL  Left rotation:  WFL  [facility]  [facility]    Tests     Lumbar     Left   Negative crossed SLR and passive SLR. Right   Negative crossed SLR and passive SLR. Left Pelvic Girdle/Sacrum   Negative: active SLR test.     Right Pelvic Girdle/Sacrum   Negative: active SLR test.     Left Hip   Negative LAUREN and piriformis. Right Hip   Positive piriformis. Negative LAUREN.                  Precautions: 2 units max** (co-insurance), HLD, prior CA    Daily Treatment Diary    Manual 2023          TPR piriformis nv          Hip Passive range of motion  nv                     Therapeutic Ex           Bike (rom) nv          SLR flx nv          SLR abd           Clams* 3x8x3s GTB          Bridges* 3x10                                           HS stretch           Modified Piriformis stretch* 10x10"          Fig four stretch           Quad stretch                                 Therapeutic Activity           Heel raises           Leg press nv          Band walks           Step ups  nv          Lateral Step ups                                 Mini squats w ue support           Wall slides           STS from chair* 3x10                     Goblet squats                      Neuromuscular RE-ed           FTEO           Tandem           SL           Skaters           Sharkies                      * = on hep

## 2023-12-14 ENCOUNTER — OFFICE VISIT (OUTPATIENT)
Dept: PHYSICAL THERAPY | Facility: REHABILITATION | Age: 63
End: 2023-12-14
Payer: COMMERCIAL

## 2023-12-14 DIAGNOSIS — M54.31 SCIATICA OF RIGHT SIDE: Primary | ICD-10-CM

## 2023-12-14 PROCEDURE — 97140 MANUAL THERAPY 1/> REGIONS: CPT | Performed by: PHYSICAL THERAPIST

## 2023-12-14 PROCEDURE — 97110 THERAPEUTIC EXERCISES: CPT | Performed by: PHYSICAL THERAPIST

## 2023-12-14 NOTE — PROGRESS NOTES
Daily Note     Today's date: 2023  Patient name: Selin Arenas  : 1960  MRN: [id number]  Referring provider: NAYELI Travis  Dx:   Encounter Diagnosis     ICD-10-CM    1. Sciatica of right side  M54.31                      Subjective: Patient states she feels that HEP activities are helping. Objective: See treatment diary below      Assessment: Patient performed exercises without c/o pain; positive response to manual intervention. Plan: Continue per plan of care. Precautions: 2 units max** (co-insurance), HLD, prior CA    Daily Treatment Diary    Manual 2023         TPR piriformis nv KK         Hip Passive range of motion  nv KK                    Therapeutic Ex           Bike (rom) nv 6 min         SLR flx nv 2x10 ea. SLR abd           Clams* 3x8x3s GTB          Bridges* 3x10 Legs on ball 20x3"                                          HS stretch  3x20" ea.          Modified Piriformis stretch* 10x10" 10x10"         Fig four stretch           Quad stretch                                 Therapeutic Activity           Heel raises           Leg press nv 65# 3x10         Band walks           Step ups  nv 8" 2x10         Lateral Step ups                                 Mini squats w ue support           Wall slides           STS from chair* 3x10                     Goblet squats                      Neuromuscular RE-ed           FTEO           Tandem           SL           Skaters           Sharkies                      * = on hep

## 2023-12-18 ENCOUNTER — OFFICE VISIT (OUTPATIENT)
Dept: PHYSICAL THERAPY | Facility: REHABILITATION | Age: 63
End: 2023-12-18
Payer: COMMERCIAL

## 2023-12-18 DIAGNOSIS — M54.31 SCIATICA OF RIGHT SIDE: Primary | ICD-10-CM

## 2023-12-18 PROCEDURE — 97110 THERAPEUTIC EXERCISES: CPT | Performed by: PHYSICAL THERAPIST

## 2023-12-18 PROCEDURE — 97140 MANUAL THERAPY 1/> REGIONS: CPT | Performed by: PHYSICAL THERAPIST

## 2023-12-18 NOTE — PROGRESS NOTES
"Daily Note     Today's date: 2023  Patient name: Aura Woods  : 1960  MRN: [id number]  Referring provider: Sidra Henley CRNP  Dx:   Encounter Diagnosis     ICD-10-CM    1. Sciatica of right side  M54.31           Start Time: 1633  Stop Time: 1705  Total time in clinic (min): 32 minutes    Subjective: Aura reports that her hip/back are improving, however continues to have greatest pain in the morning.       Objective: See treatment diary below      Assessment: Tolerated treatment well. Patient demonstrated fatigue post treatment, exhibited good technique with therapeutic exercises, and would benefit from continued PT      Plan: Continue per plan of care.         Precautions: 2 units max** (co-insurance), HLD, prior CA    Daily Treatment Diary    Manual 2023        TPR piriformis nv KK         Hip Passive range of motion  nv KK                    Therapeutic Ex           Bike (rom) nv 6 min 5' lvl 1        SLR flx nv 2x10 ea.         SLR abd           Clams* 3x8x3s GTB  GTB 3x8xs        Bridges* 3x10 Legs on ball 20x3\"         Bridges w march   8x                              HS stretch  3x20\" ea. Active 10x5s        Modified Piriformis stretch* 10x10\" 10x10\" 5x10s        Fig four stretch           Quad stretch                                 Therapeutic Activity           Heel raises           Leg press nv 65# 3x10         Band walks           Step ups  nv 8\" 2x10         Lateral Step ups                                 Mini squats w ue support           Wall slides           STS from chair* 3x10  3x8 10#                   Goblet squats                      Neuromuscular RE-ed           FTEO           Tandem           SL           Skaters           Sharkies                      * = on hep              "

## 2023-12-21 ENCOUNTER — OFFICE VISIT (OUTPATIENT)
Dept: PHYSICAL THERAPY | Facility: REHABILITATION | Age: 63
End: 2023-12-21
Payer: COMMERCIAL

## 2023-12-21 DIAGNOSIS — M54.31 SCIATICA OF RIGHT SIDE: Primary | ICD-10-CM

## 2023-12-21 PROCEDURE — 97110 THERAPEUTIC EXERCISES: CPT | Performed by: PHYSICAL THERAPIST

## 2023-12-21 PROCEDURE — 97530 THERAPEUTIC ACTIVITIES: CPT | Performed by: PHYSICAL THERAPIST

## 2023-12-21 NOTE — PROGRESS NOTES
"Daily Note     Today's date: 2023  Patient name: Aura Woods  : 1960  MRN: [id number]  Referring provider: Sidra Henley CRNP  Dx:   Encounter Diagnosis     ICD-10-CM    1. Sciatica of right side  M54.31           Start Time: 1640  Stop Time: 1710  Total time in clinic (min): 30 minutes    Subjective: Aura reports that her hip and back feel much improved.       Objective: See treatment diary below      Assessment: Tolerated treatment well. Patient demonstrated fatigue post treatment, exhibited good technique with therapeutic exercises, and would benefit from continued PT.       Plan: Continue per plan of care.  Potential discharge next visit.        Precautions: 2 units max** (co-insurance), HLD, prior CA    Daily Treatment Diary    Manual 2023       TPR piriformis nv KK         Hip Passive range of motion  nv KK                    Therapeutic Ex           Bike (rom) nv 6 min 5' lvl 1 5' lvl        SLR flx nv 2x10 ea.         SLR abd           Clams* 3x8x3s GTB  GTB 3x8xs GTB [id number]       Bridges* 3x10 Legs on ball 20x3\"  3x12 bw       Bridges w march   8x                              HS stretch  3x20\" ea. Active 10x5s Active 10x5s       Modified Piriformis stretch* 10x10\" 10x10\" 5x10s 5x10s       Fig four stretch           Quad stretch                                 Therapeutic Activity           Heel raises           Leg press nv 65# 3x10         Band walks           Step ups  nv 8\" 2x10  8\" 3x10       Lateral Step ups                                 Mini squats w ue support           Wall slides           STS from chair* 3x10  3x8 10# 3x8 10#                  Goblet squats                      Neuromuscular RE-ed           FTEO           Tandem           SL           Skaters           Sharkies                      * = on hep                  " Mode of arrival (squad #, walk in, police, etc) : walk in from home        Chief complaint(s): left thumb injury        Arrival Note (brief scenario, treatment PTA, etc). : pt brought in by mom c/o left thumb injury sustained on 12/22/20. Pt states she was playing basketball when the ball was thrown into her left thumb and bent it backwards. Pt states she heard a \"snap\" when it happened. Pt states she is able to  her thumb, but it hurts when she tries to bend it. Pt states it hurts when someone tries to touch the thumb. Pt states she noticed her thumb swelling. Mom states she did not give pt anything at home for pain. Pt is alert and oriented. Ice pack given for comfort. Swelling is noted to the left thumb at this time, with good cap refill <3 seconds. C= \"Have you ever felt that you should Cut down on your drinking? \"  No  A= \"Have people Annoyed you by criticizing your drinking? \"  No  G= \"Have you ever felt bad or Guilty about your drinking? \"  No  E= \"Have you ever had a drink as an Eye-opener first thing in the morning to steady your nerves or to help a hangover? \"  No      Deferred []      Reason for deferring: N/A    *If yes to two or more: probable alcohol abuse. Riana Malagon RN  12/23/20 [id number]

## 2023-12-28 ENCOUNTER — OFFICE VISIT (OUTPATIENT)
Dept: PHYSICAL THERAPY | Facility: REHABILITATION | Age: 63
End: 2023-12-28
Payer: COMMERCIAL

## 2023-12-28 DIAGNOSIS — M54.31 SCIATICA OF RIGHT SIDE: Primary | ICD-10-CM

## 2023-12-28 PROCEDURE — 97110 THERAPEUTIC EXERCISES: CPT | Performed by: PHYSICAL THERAPIST

## 2023-12-28 PROCEDURE — 97530 THERAPEUTIC ACTIVITIES: CPT | Performed by: PHYSICAL THERAPIST

## 2023-12-28 NOTE — PROGRESS NOTES
"Daily Note     Today's date: 2023  Patient name: Aura Woods  : 1960  MRN: [id number]  Referring provider: Sidra Henley CRNP  Dx:   Encounter Diagnosis     ICD-10-CM    1. Sciatica of right side  M54.31                      Subjective: Aura reports that her right buttock is more sore today, denies any specific sukhjinder.       Objective: See treatment diary below      Assessment: Tolerated treatment well. Patient demonstrated fatigue post treatment, exhibited good technique with therapeutic exercises, and would benefit from continued PT.       Plan: Continue per plan of care.         Precautions: 2 units max** (co-insurance), HLD, prior CA    Daily Treatment Diary    Manual 2023      TPR piriformis nv KK         Hip Passive range of motion  nv KK                    Therapeutic Ex           Bike (rom) nv 6 min 5' lvl 1 5' lvl  5' lvl 1      SLR flx nv 2x10 ea.         SLR abd           Clams* 3x8x3s GTB  GTB 3x8xs GTB [id number] GTB 3x10      Bridges* 3x10 Legs on ball 20x3\"  3x12 bw 3x12      Bridges w march   8x                              HS stretch  3x20\" ea. Active 10x5s Active 10x5s Active 10x5s      Modified Piriformis stretch* 10x10\" 10x10\" 5x10s 5x10s 5x10s      Standing TFL stretch     5x10s                                       Therapeutic Activity           Heel raises           Leg press nv 65# 3x10         Band walks           Step ups  nv 8\" 2x10  8\" 3x10 8\" 3x12      Lateral Step ups           Paloff rotations     GTB 2x10 ea                 Mini squats w ue support           Wall slides           STS from chair* 3x10  3x8 10# 3x8 10# 3x8 10#                 Goblet squats                      Neuromuscular RE-ed           FTEO           Tandem           SL           Skaters           Sharkies                      * = on hep                      "

## 2024-01-15 ENCOUNTER — OFFICE VISIT (OUTPATIENT)
Dept: FAMILY MEDICINE CLINIC | Facility: CLINIC | Age: 64
End: 2024-01-15
Payer: COMMERCIAL

## 2024-01-15 VITALS
RESPIRATION RATE: 16 BRPM | DIASTOLIC BLOOD PRESSURE: 82 MMHG | BODY MASS INDEX: 22.18 KG/M2 | OXYGEN SATURATION: 98 % | HEIGHT: 66 IN | WEIGHT: 138 LBS | HEART RATE: 106 BPM | SYSTOLIC BLOOD PRESSURE: 128 MMHG

## 2024-01-15 DIAGNOSIS — C50.411 MALIGNANT NEOPLASM OF UPPER-OUTER QUADRANT OF RIGHT BREAST IN FEMALE, ESTROGEN RECEPTOR POSITIVE: ICD-10-CM

## 2024-01-15 DIAGNOSIS — Z17.0 MALIGNANT NEOPLASM OF UPPER-OUTER QUADRANT OF RIGHT BREAST IN FEMALE, ESTROGEN RECEPTOR POSITIVE: ICD-10-CM

## 2024-01-15 DIAGNOSIS — M54.31 BACK PAIN WITH RIGHT-SIDED SCIATICA: Primary | ICD-10-CM

## 2024-01-15 DIAGNOSIS — M54.16 LUMBAR RADICULOPATHY: ICD-10-CM

## 2024-01-15 PROCEDURE — 99213 OFFICE O/P EST LOW 20 MIN: CPT | Performed by: NURSE PRACTITIONER

## 2024-01-15 NOTE — PROGRESS NOTES
"Laurie Woods is a 63 y.o. female who presents for evaluation of low back pain. The patient has had  recent onset low back pain with right sided sciatica 2 months ago . She attended 5 sessions of PT without significant improvement.  Onset was related to / precipitated by no known injury. The pain is located in the lumbar region and right gluteal area . The pain is described as aching, burning, sharp, stiffness, and throbbing and occurs intermittently and upon awakening. She rates her pain as moderate. Symptoms are exacerbated by lying down. Symptoms are improved by lumbar support pillow, muscle relaxants and walking . She has also tried  PT, topical lidocaine  which provided no symptom relief. She has no other symptoms associated with the back pain. The patient has no \"red flag\" history indicative of complicated back pain. She had normal Dexa scan 7/2022. She was a gymnast for many years.     The following portions of the patient's history were reviewed and updated as appropriate: allergies, current medications, past family history, past medical history, past social history, past surgical history, and problem list..    Review of Systems  Pertinent items are noted in HPI..    Objective   Vitals:    01/15/24 1340   BP: 128/82   BP Location: Left arm   Pulse: (!) 106   Resp: 16   SpO2: 98%   Weight: 62.6 kg (138 lb)   Height: 5' 6.42\" (1.687 m)   Physical Exam  Vitals and nursing note reviewed.   Constitutional:       General: She is not in acute distress.     Appearance: Normal appearance.   Cardiovascular:      Rate and Rhythm: Normal rate and regular rhythm.      Pulses: Normal pulses.   Pulmonary:      Effort: Pulmonary effort is normal.      Breath sounds: Normal breath sounds.   Musculoskeletal:      Lumbar back: Spasms and tenderness present. No deformity or signs of trauma. Normal range of motion. Positive right straight leg raise test.   Skin:     General: Skin is warm and dry.      Coloration: " Skin is not pale.   Neurological:      General: No focal deficit present.      Mental Status: She is alert. Mental status is at baseline.      Motor: No weakness.      Gait: Gait abnormal (antalgic gait).   Psychiatric:         Mood and Affect: Mood normal.         Behavior: Behavior normal.         Assessment/Plan   Sciatica possibly due to intervertebral disc herniation at L4-5.    No significant improvement with PT    The case discussed with patient using patient centered shared decision making.The patient was counseled regarding instructions for management,-- risk factor reductions,-- prognosis,-- impressions,-- risks and benefits of treatment options,-- importance of compliance with treatment. I have reviewed the instructions with the patient, answering all questions to her satisfaction.    Check imaging of lumbar region  Natural history and expected course discussed. Questions answered.  Heat to affected area as needed for local pain relief.  OTC analgesics as needed..  Consider referral to pain management, chiropractor  Will discuss result of XR-will determine next step from there.   Answered all pt's questions today

## 2024-01-17 ENCOUNTER — HOSPITAL ENCOUNTER (OUTPATIENT)
Dept: RADIOLOGY | Facility: HOSPITAL | Age: 64
Discharge: HOME | End: 2024-01-17
Payer: COMMERCIAL

## 2024-01-17 DIAGNOSIS — M54.31 BACK PAIN WITH RIGHT-SIDED SCIATICA: ICD-10-CM

## 2024-01-17 DIAGNOSIS — M54.16 LUMBAR RADICULOPATHY: ICD-10-CM

## 2024-01-17 PROCEDURE — 72110 X-RAY EXAM L-2 SPINE 4/>VWS: CPT

## 2024-01-31 ENCOUNTER — TELEPHONE (OUTPATIENT)
Dept: FAMILY MEDICINE CLINIC | Facility: CLINIC | Age: 64
End: 2024-01-31

## 2024-01-31 DIAGNOSIS — M54.31 BACK PAIN WITH RIGHT-SIDED SCIATICA: Primary | ICD-10-CM

## 2024-03-26 DIAGNOSIS — E78.2 MIXED HYPERLIPIDEMIA: ICD-10-CM

## 2024-03-26 NOTE — TELEPHONE ENCOUNTER
Medication: Atorvastatin    Dose/Frequency: 20mg/ daily    Quantity: 90    Pharmacy: [id number] - [place] - [address]     Office:   [x] PCP/Provider -   [] Speciality/Provider -     Does the patient have enough for 3 days?   [x] Yes   [] No - Send as HP to POD

## 2024-03-27 RX ORDER — ATORVASTATIN CALCIUM 20 MG/1
20 TABLET, FILM COATED ORAL
Qty: 90 TABLET | Refills: 1 | Status: SHIPPED | OUTPATIENT
Start: 2024-03-27

## 2024-06-19 ENCOUNTER — APPOINTMENT (OUTPATIENT)
Dept: LAB | Facility: CLINIC | Age: 64
End: 2024-06-19
Payer: MEDICARE

## 2024-06-19 ENCOUNTER — RA CDI HCC (OUTPATIENT)
Dept: OTHER | Facility: HOSPITAL | Age: 64
End: 2024-06-19

## 2024-06-19 LAB
ALBUMIN SERPL BCP-MCNC: 4.4 G/DL (ref 3.5–5)
ALP SERPL-CCNC: 54 U/L (ref 34–104)
ALT SERPL W P-5'-P-CCNC: 46 U/L (ref 7–52)
ANION GAP SERPL CALCULATED.3IONS-SCNC: 7 MMOL/L (ref 4–13)
AST SERPL W P-5'-P-CCNC: 53 U/L (ref 13–39)
BASOPHILS # BLD AUTO: 0.06 THOUSANDS/ÂΜL (ref 0–0.1)
BASOPHILS NFR BLD AUTO: 1 % (ref 0–1)
BILIRUB SERPL-MCNC: 1.24 MG/DL (ref 0.2–1)
BUN SERPL-MCNC: 13 MG/DL (ref 5–25)
CALCIUM SERPL-MCNC: 9.6 MG/DL (ref 8.4–10.2)
CHLORIDE SERPL-SCNC: 102 MMOL/L (ref 96–108)
CHOLEST SERPL-MCNC: 223 MG/DL
CO2 SERPL-SCNC: 30 MMOL/L (ref 21–32)
CREAT SERPL-MCNC: 0.82 MG/DL (ref 0.6–1.3)
EOSINOPHIL # BLD AUTO: 0.12 THOUSAND/ÂΜL (ref 0–0.61)
EOSINOPHIL NFR BLD AUTO: 2 % (ref 0–6)
ERYTHROCYTE [DISTWIDTH] IN BLOOD BY AUTOMATED COUNT: 13.2 % (ref 11.6–15.1)
EST. AVERAGE GLUCOSE BLD GHB EST-MCNC: 128 MG/DL
GFR SERPL CREATININE-BSD FRML MDRD: 75 ML/MIN/1.73SQ M
GLUCOSE P FAST SERPL-MCNC: 113 MG/DL (ref 65–99)
HBA1C MFR BLD: 6.1 %
HCT VFR BLD AUTO: 41.6 % (ref 34.8–46.1)
HDLC SERPL-MCNC: 73 MG/DL
HGB BLD-MCNC: 13.2 G/DL (ref 11.5–15.4)
IMM GRANULOCYTES # BLD AUTO: 0.04 THOUSAND/UL (ref 0–0.2)
IMM GRANULOCYTES NFR BLD AUTO: 1 % (ref 0–2)
LDLC SERPL CALC-MCNC: 99 MG/DL (ref 0–100)
LYMPHOCYTES # BLD AUTO: 1.37 THOUSANDS/ÂΜL (ref 0.6–4.47)
LYMPHOCYTES NFR BLD AUTO: 21 % (ref 14–44)
MCH RBC QN AUTO: 29.7 PG (ref 26.8–34.3)
MCHC RBC AUTO-ENTMCNC: 31.7 G/DL (ref 31.4–37.4)
MCV RBC AUTO: 94 FL (ref 82–98)
MONOCYTES # BLD AUTO: 0.62 THOUSAND/ÂΜL (ref 0.17–1.22)
MONOCYTES NFR BLD AUTO: 10 % (ref 4–12)
NEUTROPHILS # BLD AUTO: 4.28 THOUSANDS/ÂΜL (ref 1.85–7.62)
NEUTS SEG NFR BLD AUTO: 65 % (ref 43–75)
NONHDLC SERPL-MCNC: 150 MG/DL
NRBC BLD AUTO-RTO: 0 /100 WBCS
PLATELET # BLD AUTO: 216 THOUSANDS/UL (ref 149–390)
PMV BLD AUTO: 10.9 FL (ref 8.9–12.7)
POTASSIUM SERPL-SCNC: 3.8 MMOL/L (ref 3.5–5.3)
PROT SERPL-MCNC: 6.9 G/DL (ref 6.4–8.4)
RBC # BLD AUTO: 4.44 MILLION/UL (ref 3.81–5.12)
SODIUM SERPL-SCNC: 139 MMOL/L (ref 135–147)
T4 FREE SERPL-MCNC: 0.51 NG/DL (ref 0.61–1.12)
TRIGL SERPL-MCNC: 255 MG/DL
TSH SERPL DL<=0.05 MIU/L-ACNC: 4.68 UIU/ML (ref 0.45–4.5)
WBC # BLD AUTO: 6.49 THOUSAND/UL (ref 4.31–10.16)

## 2024-06-19 NOTE — PROGRESS NOTES
HCC coding opportunities       Chart reviewed, no opportunity found: CHART REVIEWED, NO OPPORTUNITY FOUND        Patients Insurance        Commercial Insurance: Sincerely Insurance

## 2024-06-26 ENCOUNTER — OFFICE VISIT (OUTPATIENT)
Dept: FAMILY MEDICINE CLINIC | Facility: CLINIC | Age: 64
End: 2024-06-26
Payer: MEDICARE

## 2024-06-26 VITALS
HEART RATE: 94 BPM | TEMPERATURE: 97.9 F | RESPIRATION RATE: 16 BRPM | HEIGHT: 66 IN | OXYGEN SATURATION: 97 % | WEIGHT: 139 LBS | SYSTOLIC BLOOD PRESSURE: 120 MMHG | BODY MASS INDEX: 22.34 KG/M2 | DIASTOLIC BLOOD PRESSURE: 74 MMHG

## 2024-06-26 DIAGNOSIS — Z00.00 WELCOME TO MEDICARE PREVENTIVE VISIT: Primary | ICD-10-CM

## 2024-06-26 DIAGNOSIS — R79.89 TSH ELEVATION: ICD-10-CM

## 2024-06-26 DIAGNOSIS — E78.00 PURE HYPERCHOLESTEROLEMIA: ICD-10-CM

## 2024-06-26 DIAGNOSIS — N18.2 CKD (CHRONIC KIDNEY DISEASE) STAGE 2, GFR 60-89 ML/MIN: ICD-10-CM

## 2024-06-26 DIAGNOSIS — R10.32 LLQ ABDOMINAL PAIN: ICD-10-CM

## 2024-06-26 DIAGNOSIS — E78.2 MIXED HYPERLIPIDEMIA: ICD-10-CM

## 2024-06-26 DIAGNOSIS — R79.89 LFTS ABNORMAL: ICD-10-CM

## 2024-06-26 PROCEDURE — G0402 INITIAL PREVENTIVE EXAM: HCPCS | Performed by: NURSE PRACTITIONER

## 2024-06-26 RX ORDER — ATORVASTATIN CALCIUM 20 MG/1
20 TABLET, FILM COATED ORAL
Qty: 90 TABLET | Refills: 3 | Status: SHIPPED | OUTPATIENT
Start: 2024-06-26

## 2024-06-26 NOTE — PATIENT INSTRUCTIONS
Medicare Preventive Visit Patient Instructions  Thank you for completing your Welcome to Medicare Visit or Medicare Annual Wellness Visit today. Your next wellness visit will be due in one year (6/27/2025).  The screening/preventive services that you may require over the next 5-10 years are detailed below. Some tests may not apply to you based off risk factors and/or age. Screening tests ordered at today's visit but not completed yet may show as past due. Also, please note that scanned in results may not display below.  Preventive Screenings:  Service Recommendations Previous Testing/Comments   Colorectal Cancer Screening  * Colonoscopy    * Fecal Occult Blood Test (FOBT)/Fecal Immunochemical Test (FIT)  * Fecal DNA/Cologuard Test  * Flexible Sigmoidoscopy Age: 45-75 years old   Colonoscopy: every 10 years (may be performed more frequently if at higher risk)  OR  FOBT/FIT: every 1 year  OR  Cologuard: every 3 years  OR  Sigmoidoscopy: every 5 years  Screening may be recommended earlier than age 45 if at higher risk for colorectal cancer. Also, an individualized decision between you and your healthcare provider will decide whether screening between the ages of 76-85 would be appropriate. Colonoscopy: 08/05/2019  FOBT/FIT: Not on file  Cologuard: Not on file  Sigmoidoscopy: Not on file    Screening Current     Breast Cancer Screening Age: 40+ years old  Frequency: every 1-2 years  Not required if history of left and right mastectomy Mammogram: 10/24/2023    History Breast Cancer   Cervical Cancer Screening Between the ages of 21-29, pap smear recommended once every 3 years.   Between the ages of 30-65, can perform pap smear with HPV co-testing every 5 years.   Recommendations may differ for women with a history of total hysterectomy, cervical cancer, or abnormal pap smears in past. Pap Smear: Not on file    History Cervical Cancer   Hepatitis C Screening Once for adults born between 1945 and 1965  More frequently in  patients at high risk for Hepatitis C Hep C Antibody: 11/02/2018    Screening Current   Diabetes Screening 1-2 times per year if you're at risk for diabetes or have pre-diabetes Fasting glucose: 113 mg/dL (6/19/2024)  A1C: 6.1 % (6/19/2024)  Screening Current   Cholesterol Screening Once every 5 years if you don't have a lipid disorder. May order more often based on risk factors. Lipid panel: 06/19/2024    Screening Not Indicated  History Lipid Disorder     Other Preventive Screenings Covered by Medicare:  Abdominal Aortic Aneurysm (AAA) Screening: covered once if your at risk. You're considered to be at risk if you have a family history of AAA.  Lung Cancer Screening: covers low dose CT scan once per year if you meet all of the following conditions: (1) Age 55-77; (2) No signs or symptoms of lung cancer; (3) Current smoker or have quit smoking within the last 15 years; (4) You have a tobacco smoking history of at least 20 pack years (packs per day multiplied by number of years you smoked); (5) You get a written order from a healthcare provider.  Glaucoma Screening: covered annually if you're considered high risk: (1) You have diabetes OR (2) Family history of glaucoma OR (3)  aged 50 and older OR (4)  American aged 65 and older  Osteoporosis Screening: covered every 2 years if you meet one of the following conditions: (1) You're estrogen deficient and at risk for osteoporosis based off medical history and other findings; (2) Have a vertebral abnormality; (3) On glucocorticoid therapy for more than 3 months; (4) Have primary hyperparathyroidism; (5) On osteoporosis medications and need to assess response to drug therapy.   Last bone density test (DXA Scan): 07/21/2022.  HIV Screening: covered annually if you're between the age of 15-65. Also covered annually if you are younger than 15 and older than 65 with risk factors for HIV infection. For pregnant patients, it is covered up to 3 times per  pregnancy.    Immunizations:  Immunization Recommendations   Influenza Vaccine Annual influenza vaccination during flu season is recommended for all persons aged >= 6 months who do not have contraindications   Pneumococcal Vaccine   * Pneumococcal conjugate vaccine = PCV13 (Prevnar 13), PCV15 (Vaxneuvance), PCV20 (Prevnar 20)  * Pneumococcal polysaccharide vaccine = PPSV23 (Pneumovax) Adults 19-65 yo with certain risk factors or if 65+ yo  If never received any pneumonia vaccine: recommend Prevnar 20 (PCV20)  Give PCV20 if previously received 1 dose of PCV13 or PPSV23   Hepatitis B Vaccine 3 dose series if at intermediate or high risk (ex: diabetes, end stage renal disease, liver disease)   Respiratory syncytial virus (RSV) Vaccine - COVERED BY MEDICARE PART D  * RSVPreF3 (Arexvy) CDC recommends that adults 60 years of age and older may receive a single dose of RSV vaccine using shared clinical decision-making (SCDM)   Tetanus (Td) Vaccine - COST NOT COVERED BY MEDICARE PART B Following completion of primary series, a booster dose should be given every 10 years to maintain immunity against tetanus. Td may also be given as tetanus wound prophylaxis.   Tdap Vaccine - COST NOT COVERED BY MEDICARE PART B Recommended at least once for all adults. For pregnant patients, recommended with each pregnancy.   Shingles Vaccine (Shingrix) - COST NOT COVERED BY MEDICARE PART B  2 shot series recommended in those 19 years and older who have or will have weakened immune systems or those 50 years and older     Health Maintenance Due:      Topic Date Due   • Colorectal Cancer Screening  08/05/2024   • HIV Screening  06/21/2025 (Originally 4/2/1975)   • Breast Cancer Screening: Mammogram  10/24/2024   • Hepatitis C Screening  Completed     Immunizations Due:      Topic Date Due   • COVID-19 Vaccine (5 - 2023-24 season) 09/01/2023   • Pneumococcal Vaccine: 65+ Years (1 of 1 - PCV) Never done   • Influenza Vaccine (Season Ended)  09/01/2024     Advance Directives   What are advance directives?  Advance directives are legal documents that state your wishes and plans for medical care. These plans are made ahead of time in case you lose your ability to make decisions for yourself. Advance directives can apply to any medical decision, such as the treatments you want, and if you want to donate organs.   What are the types of advance directives?  There are many types of advance directives, and each state has rules about how to use them. You may choose a combination of any of the following:  Living will:  This is a written record of the treatment you want. You can also choose which treatments you do not want, which to limit, and which to stop at a certain time. This includes surgery, medicine, IV fluid, and tube feedings.   Durable power of  for healthcare (DPAHC):  This is a written record that states who you want to make healthcare choices for you when you are unable to make them for yourself. This person, called a proxy, is usually a family member or a friend. You may choose more than 1 proxy.  Do not resuscitate (DNR) order:  A DNR order is used in case your heart stops beating or you stop breathing. It is a request not to have certain forms of treatment, such as CPR. A DNR order may be included in other types of advance directives.  Medical directive:  This covers the care that you want if you are in a coma, near death, or unable to make decisions for yourself. You can list the treatments you want for each condition. Treatment may include pain medicine, surgery, blood transfusions, dialysis, IV or tube feedings, and a ventilator (breathing machine).  Values history:  This document has questions about your views, beliefs, and how you feel and think about life. This information can help others choose the care that you would choose.  Why are advance directives important?  An advance directive helps you control your care. Although spoken  wishes may be used, it is better to have your wishes written down. Spoken wishes can be misunderstood, or not followed. Treatments may be given even if you do not want them. An advance directive may make it easier for your family to make difficult choices about your care.       © Copyright [employer] 2018 Information is for End User's use only and may not be sold, redistributed or otherwise used for commercial purposes. All illustrations and images included in CareNotes® are the copyrighted property of A.D.A.M., Inc. or [employer]

## 2024-06-26 NOTE — PROGRESS NOTES
Ambulatory Visit  Name: Aura Woods      : 1960      MRN: [id number]  Encounter Provider: NAYELI Asif  Encounter Date: 2024   Encounter department: [facility] [place]    Assessment & Plan   1. Welcome to Medicare preventive visit  2. CKD (chronic kidney disease) stage 2, GFR 60-89 ml/min  -     Comprehensive metabolic panel  3. Pure hypercholesterolemia  -     Comprehensive metabolic panel  -     TSH, 3rd generation with Free T4 reflex  -     Lipid panel; Future  4. LFTs abnormal  -     Comprehensive metabolic panel  5. TSH elevation  -     TSH, 3rd generation with Free T4 reflex  6. LLQ abdominal pain    The case discussed with patient using patient centered shared decision making.The patient was counseled regarding instructions for management,-- risk factor reductions,-- prognosis,-- impressions,-- risks and benefits of treatment options,-- importance of compliance with treatment. I have reviewed the instructions with the patient, answering all questions to her satisfaction.    1.  Mild elevation in LFTs and lipids  Repeat in 3 months    2.  Transient onset abdominal pain  Consistent with diverticulitis on exam  Discussed diagnostic and treatment-patient declines CAT scan at this time  Will follow liquid diet  Will keep in contact with me.  If no better by Friday morning, agrees to CAT scan and treatment  Will order blood work  Advised emergency department if symptoms rapidly escalate patient agrees    3.  TSH borderline  Recheck in 3 months as well    4.  Cataract  Planned extraction in October     Preventive health issues were discussed with patient, and age appropriate screening tests were ordered as noted in patient's After Visit Summary. Personalized health advice and appropriate referrals for health education or preventive services given if needed, as noted in patient's After Visit Summary.    History of Present Illness     HPI   Patient Care Team:  Sidra Henley  NAYELI as PCP - General (Family Medicine)    Review of Systems   Constitutional:  Negative for fatigue and fever.   HENT:  Negative for trouble swallowing.    Respiratory:  Negative for cough and shortness of breath.    Cardiovascular: Negative.    Gastrointestinal:  Positive for abdominal pain (Since eating dinner last night.  Ate some peanuts past few days.  Pain is in left lower quadrant.  Had normal bowel movement.  No blood in stool.  History of colonoscopy 5 years ago with polyps removed.  No history of diverticulitis.). Negative for blood in stool and diarrhea.   Genitourinary:         Chronically self catheterizes  Status post hysterectomy   Musculoskeletal:  Positive for back pain (Back pain).   Neurological: Negative.      Medical History Reviewed by provider this encounter:       Annual Wellness Visit Questionnaire   Aura is here for her Welcome to Medicare visit. Last Medicare Wellness visit information reviewed, patient interviewed and updates made to the record today.      Health Risk Assessment:   Patient rates overall health as very good. Patient feels that their physical health rating is same. Patient is very satisfied with their life. Eyesight was rated as slightly worse. Hearing was rated as same. Patient feels that their emotional and mental health rating is same. Patients states they are never, rarely angry. Patient states they are never, rarely unusually tired/fatigued. Pain experienced in the last 7 days has been some. Patient's pain rating has been 5/10. Patient states that she has experienced no weight loss or gain in last 6 months. Abdominal pain    Depression Screening:   PHQ-2 Score: 0      Fall Risk Screening:   In the past year, patient has experienced: no history of falling in past year      Urinary Incontinence Screening:   Patient has not leaked urine accidently in the last six months.     Home Safety:  Patient does not have trouble with stairs inside or outside of their home.  Patient has working smoke alarms and has working carbon monoxide detector. Home safety hazards include: none.     Nutrition:   Current diet is Regular.     Medications:   Patient is not currently taking any over-the-counter supplements. Patient is able to manage medications.     Activities of Daily Living (ADLs)/Instrumental Activities of Daily Living (IADLs):   Walk and transfer into and out of bed and chair?: Yes  Dress and groom yourself?: Yes    Bathe or shower yourself?: Yes    Feed yourself? Yes  Do your laundry/housekeeping?: Yes  Manage your money, pay your bills and track your expenses?: Yes  Make your own meals?: Yes    Do your own shopping?: Yes    Previous Hospitalizations:   Any hospitalizations or ED visits within the last 12 months?: No      Advance Care Planning:   Living will: Yes    Durable POA for healthcare: Yes    Advanced directive: Yes    Advanced directive counseling given: Yes      Cognitive Screening:   Provider or family/friend/caregiver concerned regarding cognition?: No    PREVENTIVE SCREENINGS      Cardiovascular Screening:    General: History Lipid Disorder and Screening Current      Diabetes Screening:     General: Screening Current      Colorectal Cancer Screening:     General: Screening Current      Breast Cancer Screening:     General: History Breast Cancer and Screening Current      Cervical Cancer Screening:    General: History Cervical Cancer      Osteoporosis Screening:    General: Screening Current      Abdominal Aortic Aneurysm (AAA) Screening:        General: Screening Not Indicated      Lung Cancer Screening:     General: Screening Not Indicated      Hepatitis C Screening:    General: Screening Current    Screening, Brief Intervention, and Referral to Treatment (SBIRT)    Screening  Typical number of drinks in a day: 0  Typical number of drinks in a week: 2  Interpretation: Low risk drinking behavior.    Single Item Drug Screening:  How often have you used an illegal drug  "(including marijuana) or a prescription medication for non-medical reasons in the past year? never    Single Item Drug Screen Score: 0  Interpretation: Negative screen for possible drug use disorder    Brief Intervention  Alcohol & drug use screenings were reviewed. No concerns regarding substance use disorder identified.     Other Counseling Topics:   Calcium and vitamin D intake and regular weightbearing exercise.     Social Determinants of Health     Financial Resource Strain: Low Risk  (11/9/2020)    Overall Financial Resource Strain (CARDIA)     Difficulty of Paying Living Expenses: Not hard at all   Food Insecurity: No Food Insecurity (6/26/2024)    Hunger Vital Sign     Worried About Running Out of Food in the Last Year: Never true     Ran Out of Food in the Last Year: Never true   Transportation Needs: No Transportation Needs (6/26/2024)    PRAPARE - Transportation     Lack of Transportation (Medical): No     Lack of Transportation (Non-Medical): No   Housing Stability: Low Risk  (6/26/2024)    Housing Stability Vital Sign     Unable to Pay for Housing in the Last Year: No     Number of Times Moved in the Last Year: 1     Homeless in the Last Year: No   Utilities: Not At Risk (6/26/2024)    [facility] Utilities     Threatened with loss of utilities: No     Vision Screening    Right eye Left eye Both eyes   Without correction cataract 20/25 unable to see   With correction          Objective     /74 (BP Location: Left arm, Patient Position: Sitting, Cuff Size: Standard)   Pulse 94   Temp 97.9 °F (36.6 °C) (Temporal)   Resp 16   Ht 5' 6.42\" (1.687 m)   Wt 63 kg (139 lb)   SpO2 97%   BMI 22.15 kg/m²     Physical Exam  Vitals and nursing note reviewed.   Constitutional:       Appearance: Normal appearance.   Neck:      Thyroid: No thyromegaly.   Cardiovascular:      Rate and Rhythm: Normal rate and regular rhythm.      Pulses: Normal pulses.   Pulmonary:      Effort: Pulmonary effort is normal.      Breath " sounds: Normal breath sounds.   Abdominal:      General: Bowel sounds are normal.      Palpations: Abdomen is soft.      Tenderness: There is abdominal tenderness in the left lower quadrant. There is no right CVA tenderness or left CVA tenderness.   Lymphadenopathy:      Cervical: No cervical adenopathy.   Skin:     General: Skin is warm and dry.      Coloration: Skin is not pale.   Neurological:      General: No focal deficit present.      Mental Status: She is alert. Mental status is at baseline.   Psychiatric:         Mood and Affect: Mood normal.         Behavior: Behavior normal.       Administrative Statements

## 2024-07-23 NOTE — PRE-PROCEDURE INSTRUCTIONS
Pre-Surgery Instructions:   Medication Instructions    Ascorbic Acid (VITAMIN C) 500 MG CAPS Take day of surgery.    atorvastatin (LIPITOR) 20 mg tablet Take night before surgery    Calcium Carb-Cholecalciferol (CALCIUM CARBONATE-VITAMIN D3 PO) Take day of surgery.    Cholecalciferol (VITAMIN D3) 5000 units CAPS Take day of surgery.    Cranberry 1000 MG CAPS Take day of surgery.    letrozole (FEMARA) 2.5 mg tablet Take day of surgery.    MEGARED OMEGA-3 KRILL OIL PO Take day of surgery.    Vitamin E 400 units TABS Take day of surgery.     Medication instructions for day surgery reviewed. Please use only a sip of water to take your instructed medications. Avoid all over the counter vitamins, supplements and NSAIDS for one week prior to surgery per anesthesia guidelines. Tylenol is ok to take as needed.     You will receive a call one business day prior to surgery with an arrival time and hospital directions. If your surgery is scheduled on a Monday, the hospital will be calling you on the Friday prior to your surgery. If you have not heard from anyone by 8pm, please call the hospital supervisor through the hospital  at [phone]. ([place] [phone] or [place] [phone]).    Do not eat or drink anything after midnight the night before your surgery, including candy, mints, lifesavers, or chewing gum. Do not drink alcohol 24hrs before your surgery. Try not to smoke at least 24hrs before your surgery.       Follow the pre surgery showering instructions as listed in the “My Surgical Experience Booklet” or otherwise provided by your surgeon's office. Do not use a blade to shave the surgical area 1 week before surgery. It is okay to use a clean electric clippers up to 24 hours before surgery. Do not apply any lotions, creams, including makeup, cologne, deodorant, or perfumes after showering on the day of your surgery. Do not use dry shampoo, hair spray, hair gel, or any type of hair products.     No  contact lenses, eye make-up, or artificial eyelashes. Remove nail polish, including gel polish, and any artificial, gel, or acrylic nails if possible. Remove all jewelry including rings and body piercing jewelry.     Wear causal clothing that is easy to take on and off. Consider your type of surgery.    Keep any valuables, jewelry, piercings at home. Please bring any specially ordered equipment (sling, braces) if indicated.    Arrange for a responsible person to drive you to and from the hospital on the day of your surgery. Please confirm the visitor policy for the day of your procedure when you receive your phone call with an arrival time.     Call the surgeon's office with any new illnesses, exposures, or additional questions prior to surgery.    Please reference your “My Surgical Experience Booklet” for additional information to prepare for your upcoming surgery.

## 2024-08-01 ENCOUNTER — APPOINTMENT (OUTPATIENT)
Dept: LAB | Facility: CLINIC | Age: 64
End: 2024-08-01
Payer: MEDICARE

## 2024-08-01 DIAGNOSIS — C50.411 MALIGNANT NEOPLASM OF UPPER-OUTER QUADRANT OF RIGHT FEMALE BREAST, UNSPECIFIED ESTROGEN RECEPTOR STATUS (HCC): ICD-10-CM

## 2024-08-01 DIAGNOSIS — E78.00 PURE HYPERCHOLESTEROLEMIA: ICD-10-CM

## 2024-08-01 DIAGNOSIS — Z17.0 ESTROGEN RECEPTOR POSITIVE: ICD-10-CM

## 2024-08-01 LAB
ALBUMIN SERPL BCG-MCNC: 4.1 G/DL (ref 3.5–5)
ALP SERPL-CCNC: 52 U/L (ref 34–104)
ALT SERPL W P-5'-P-CCNC: 15 U/L (ref 7–52)
ANION GAP SERPL CALCULATED.3IONS-SCNC: 7 MMOL/L (ref 4–13)
AST SERPL W P-5'-P-CCNC: 21 U/L (ref 13–39)
BILIRUB SERPL-MCNC: 0.71 MG/DL (ref 0.2–1)
BUN SERPL-MCNC: 12 MG/DL (ref 5–25)
CALCIUM SERPL-MCNC: 9.2 MG/DL (ref 8.4–10.2)
CHLORIDE SERPL-SCNC: 105 MMOL/L (ref 96–108)
CO2 SERPL-SCNC: 27 MMOL/L (ref 21–32)
CREAT SERPL-MCNC: 0.74 MG/DL (ref 0.6–1.3)
ERYTHROCYTE [DISTWIDTH] IN BLOOD BY AUTOMATED COUNT: 12.5 % (ref 11.6–15.1)
GFR SERPL CREATININE-BSD FRML MDRD: 85 ML/MIN/1.73SQ M
GLUCOSE P FAST SERPL-MCNC: 108 MG/DL (ref 65–99)
HCT VFR BLD AUTO: 38.9 % (ref 34.8–46.1)
HGB BLD-MCNC: 12.4 G/DL (ref 11.5–15.4)
MCH RBC QN AUTO: 29.2 PG (ref 26.8–34.3)
MCHC RBC AUTO-ENTMCNC: 31.9 G/DL (ref 31.4–37.4)
MCV RBC AUTO: 92 FL (ref 82–98)
PLATELET # BLD AUTO: 273 THOUSANDS/UL (ref 149–390)
PMV BLD AUTO: 10.4 FL (ref 8.9–12.7)
POTASSIUM SERPL-SCNC: 3.9 MMOL/L (ref 3.5–5.3)
PROT SERPL-MCNC: 6.8 G/DL (ref 6.4–8.4)
RBC # BLD AUTO: 4.25 MILLION/UL (ref 3.81–5.12)
SODIUM SERPL-SCNC: 139 MMOL/L (ref 135–147)
WBC # BLD AUTO: 6.32 THOUSAND/UL (ref 4.31–10.16)

## 2024-08-01 PROCEDURE — 36415 COLL VENOUS BLD VENIPUNCTURE: CPT

## 2024-08-01 PROCEDURE — 85027 COMPLETE CBC AUTOMATED: CPT

## 2024-08-01 PROCEDURE — 80053 COMPREHEN METABOLIC PANEL: CPT

## 2024-08-05 ENCOUNTER — HOSPITAL ENCOUNTER (OUTPATIENT)
Facility: AMBULARY SURGERY CENTER | Age: 64
Setting detail: OUTPATIENT SURGERY
Discharge: HOME | End: 2024-08-05
Attending: OPHTHALMOLOGY | Admitting: OPHTHALMOLOGY
Payer: MEDICARE

## 2024-08-05 ENCOUNTER — ANESTHESIA (OUTPATIENT)
Dept: PERIOP | Facility: AMBULARY SURGERY CENTER | Age: 64
End: 2024-08-05
Payer: MEDICARE

## 2024-08-05 ENCOUNTER — ANESTHESIA EVENT (OUTPATIENT)
Dept: PERIOP | Facility: AMBULARY SURGERY CENTER | Age: 64
End: 2024-08-05
Payer: MEDICARE

## 2024-08-05 VITALS
TEMPERATURE: 97.6 F | OXYGEN SATURATION: 96 % | SYSTOLIC BLOOD PRESSURE: 117 MMHG | HEART RATE: 79 BPM | RESPIRATION RATE: 18 BRPM | DIASTOLIC BLOOD PRESSURE: 56 MMHG

## 2024-08-05 DIAGNOSIS — H25.811 COMBINED FORMS OF AGE-RELATED CATARACT OF RIGHT EYE: Primary | ICD-10-CM

## 2024-08-05 PROCEDURE — V2632 POST CHMBR INTRAOCULAR LENS: HCPCS | Performed by: OPHTHALMOLOGY

## 2024-08-05 DEVICE — STERILE UV AND BLUE LIGHT FILTERING ACRYLIC FOLDABLE ASPHERIC POSTERIOR CHAMBER INTRAOCULAR LENS
Type: IMPLANTABLE DEVICE | Site: EYE | Status: FUNCTIONAL
Brand: CLAREON

## 2024-08-05 RX ORDER — CYCLOPENTOLATE HYDROCHLORIDE 10 MG/ML
1 SOLUTION/ DROPS OPHTHALMIC
Status: COMPLETED | OUTPATIENT
Start: 2024-08-05 | End: 2024-08-05

## 2024-08-05 RX ORDER — BALANCED SALT SOLUTION 6.4; .75; .48; .3; 3.9; 1.7 MG/ML; MG/ML; MG/ML; MG/ML; MG/ML; MG/ML
SOLUTION OPHTHALMIC AS NEEDED
Status: DISCONTINUED | OUTPATIENT
Start: 2024-08-05 | End: 2024-08-05 | Stop reason: HOSPADM

## 2024-08-05 RX ORDER — MIDAZOLAM HYDROCHLORIDE 2 MG/2ML
INJECTION, SOLUTION INTRAMUSCULAR; INTRAVENOUS AS NEEDED
Status: DISCONTINUED | OUTPATIENT
Start: 2024-08-05 | End: 2024-08-05

## 2024-08-05 RX ORDER — PHENYLEPHRINE HYDROCHLORIDE 25 MG/ML
1 SOLUTION/ DROPS OPHTHALMIC
Status: COMPLETED | OUTPATIENT
Start: 2024-08-05 | End: 2024-08-05

## 2024-08-05 RX ORDER — KETOROLAC TROMETHAMINE 5 MG/ML
1 SOLUTION OPHTHALMIC 4 TIMES DAILY
Start: 2024-08-05

## 2024-08-05 RX ORDER — TETRACAINE HYDROCHLORIDE 5 MG/ML
1 SOLUTION OPHTHALMIC ONCE
Status: COMPLETED | OUTPATIENT
Start: 2024-08-05 | End: 2024-08-05

## 2024-08-05 RX ORDER — SODIUM CHLORIDE, SODIUM LACTATE, POTASSIUM CHLORIDE, CALCIUM CHLORIDE 600; 310; 30; 20 MG/100ML; MG/100ML; MG/100ML; MG/100ML
125 INJECTION, SOLUTION INTRAVENOUS CONTINUOUS
Status: DISCONTINUED | OUTPATIENT
Start: 2024-08-05 | End: 2024-08-05 | Stop reason: HOSPADM

## 2024-08-05 RX ORDER — PREDNISOLONE ACETATE 10 MG/ML
1 SUSPENSION/ DROPS OPHTHALMIC 4 TIMES DAILY
Start: 2024-08-05

## 2024-08-05 RX ORDER — GATIFLOXACIN 5 MG/ML
SOLUTION/ DROPS OPHTHALMIC AS NEEDED
Status: DISCONTINUED | OUTPATIENT
Start: 2024-08-05 | End: 2024-08-05 | Stop reason: HOSPADM

## 2024-08-05 RX ORDER — TETRACAINE HYDROCHLORIDE 5 MG/ML
SOLUTION OPHTHALMIC AS NEEDED
Status: DISCONTINUED | OUTPATIENT
Start: 2024-08-05 | End: 2024-08-05 | Stop reason: HOSPADM

## 2024-08-05 RX ORDER — KETOROLAC TROMETHAMINE 5 MG/ML
1 SOLUTION OPHTHALMIC
Status: COMPLETED | OUTPATIENT
Start: 2024-08-05 | End: 2024-08-05

## 2024-08-05 RX ORDER — LIDOCAINE HYDROCHLORIDE 20 MG/ML
1 JELLY TOPICAL
Status: COMPLETED | OUTPATIENT
Start: 2024-08-05 | End: 2024-08-05

## 2024-08-05 RX ORDER — LIDOCAINE HYDROCHLORIDE 10 MG/ML
INJECTION, SOLUTION EPIDURAL; INFILTRATION; INTRACAUDAL; PERINEURAL AS NEEDED
Status: DISCONTINUED | OUTPATIENT
Start: 2024-08-05 | End: 2024-08-05 | Stop reason: HOSPADM

## 2024-08-05 RX ORDER — GATIFLOXACIN 5 MG/ML
1 SOLUTION/ DROPS OPHTHALMIC 2 TIMES DAILY
Start: 2024-08-05

## 2024-08-05 RX ADMIN — TETRACAINE HYDROCHLORIDE 1 DROP: 5 SOLUTION OPHTHALMIC at 09:20

## 2024-08-05 RX ADMIN — LIDOCAINE HYDROCHLORIDE 1 APPLICATION: 20 JELLY TOPICAL at 09:20

## 2024-08-05 RX ADMIN — PHENYLEPHRINE HYDROCHLORIDE 1 DROP: 25 SOLUTION/ DROPS OPHTHALMIC at 10:05

## 2024-08-05 RX ADMIN — LIDOCAINE HYDROCHLORIDE 1 APPLICATION: 20 JELLY TOPICAL at 09:35

## 2024-08-05 RX ADMIN — LIDOCAINE HYDROCHLORIDE 1 APPLICATION: 20 JELLY TOPICAL at 09:50

## 2024-08-05 RX ADMIN — CYCLOPENTOLATE HYDROCHLORIDE 1 DROP: 10 SOLUTION/ DROPS OPHTHALMIC at 10:05

## 2024-08-05 RX ADMIN — MIDAZOLAM 2 MG: 1 INJECTION INTRAMUSCULAR; INTRAVENOUS at 10:28

## 2024-08-05 RX ADMIN — PHENYLEPHRINE HYDROCHLORIDE 1 DROP: 25 SOLUTION/ DROPS OPHTHALMIC at 09:35

## 2024-08-05 RX ADMIN — KETOROLAC TROMETHAMINE 1 DROP: 5 SOLUTION/ DROPS OPHTHALMIC at 09:20

## 2024-08-05 RX ADMIN — KETOROLAC TROMETHAMINE 1 DROP: 5 SOLUTION/ DROPS OPHTHALMIC at 09:35

## 2024-08-05 RX ADMIN — CYCLOPENTOLATE HYDROCHLORIDE 1 DROP: 10 SOLUTION/ DROPS OPHTHALMIC at 09:21

## 2024-08-05 RX ADMIN — KETOROLAC TROMETHAMINE 1 DROP: 5 SOLUTION/ DROPS OPHTHALMIC at 09:50

## 2024-08-05 RX ADMIN — CYCLOPENTOLATE HYDROCHLORIDE 1 DROP: 10 SOLUTION/ DROPS OPHTHALMIC at 09:35

## 2024-08-05 RX ADMIN — KETOROLAC TROMETHAMINE 1 DROP: 5 SOLUTION/ DROPS OPHTHALMIC at 10:05

## 2024-08-05 RX ADMIN — PHENYLEPHRINE HYDROCHLORIDE 1 DROP: 25 SOLUTION/ DROPS OPHTHALMIC at 09:50

## 2024-08-05 RX ADMIN — CYCLOPENTOLATE HYDROCHLORIDE 1 DROP: 10 SOLUTION/ DROPS OPHTHALMIC at 09:50

## 2024-08-05 RX ADMIN — PHENYLEPHRINE HYDROCHLORIDE 1 DROP: 25 SOLUTION/ DROPS OPHTHALMIC at 09:20

## 2024-08-05 NOTE — ANESTHESIA PREPROCEDURE EVALUATION
Procedure:  EXTRACTION EXTRACAPSULAR CATARACT PHACO INTRAOCULAR LENS (IOL) (Right: Eye)    Relevant Problems   CARDIO   (+) Pure hypercholesterolemia      /RENAL   (+) CKD (chronic kidney disease) stage 2, GFR 60-89 ml/min      GYN   (+) Malignant neoplasm of upper-outer quadrant of right breast in female, estrogen receptor positive (HCC)        Physical Exam    Airway    Mallampati score: II  TM Distance: >3 FB  Neck ROM: full     Dental   Comment: Denies loose teeth     Cardiovascular  Cardiovascular exam normal    Pulmonary  Pulmonary exam normal     Other Findings  Portions of exam deferred due to low yield and/or unknown COVID statuspost-pubertal.      Anesthesia Plan  ASA Score- 2     Anesthesia Type- IV sedation with anesthesia with ASA Monitors.         Additional Monitors:     Airway Plan:            Plan Factors-Exercise tolerance (METS): >4 METS.    Chart reviewed.   Existing labs reviewed. Patient summary reviewed.    Patient is not a current smoker.              Induction- intravenous.    Postoperative Plan-         Informed Consent- Anesthetic plan and risks discussed with patient.  I personally reviewed this patient with the CRNA. Discussed and agreed on the Anesthesia Plan with the CRNA..

## 2024-08-05 NOTE — DISCHARGE INSTR - AVS FIRST PAGE
Dr. Strickland Cataract Instructions    Activity:     1.  No Driving until instructed   2.  Keep shield on until seen tomorrow except when administering drops   3.  No heavy lifting   4.  No water in eye     Diet:     1.  Resume normal diet    Normal Symptoms:     1.  Mild Headache   2. Scratchy or picky feeling around eye    Call the office if:     1.  You have any questions or concerns   2.  If eye pain is not relieved by extra strength tylenol    Office phone number:  [phone]      Next appointment:     1.  See Dr Strickland at his office tomorrow as scheduled   __________________________________________________________   2.  Bring blue eye kit with you and eyedrops to the office    A new set of comprehensive instructions will be given and reviewed with you during your office visit tomorrow.

## 2024-08-05 NOTE — OP NOTE
OPERATIVE REPORT    PATIENT NAME: Aura Woods    :  1960  MRN: [id number]  Pt Location: [facility] OR ROOM 01    Surgery Date: 2024    Surgeons and Role:     * Zelalem Strickland MD - Primary    Posterior subcapsular polar age-related cataract, right eye [H25.041]    Post-Op Diagnosis Codes:     * Posterior subcapsular polar age-related cataract, right eye [H25.041]    Procedure(s):  EXTRACTION EXTRACAPSULAR CATARACT PHACO INTRAOCULAR LENS (IOL)    Anesthesia Type:   IV Sedation with Anesthesia    Operative Indications:  Posterior subcapsular polar age-related cataract, right eye [H25.041]  Decreased vision to 20/200. With problems driving.  Pt requested cataract sx the right eye    Procedure and Technique:    Procedure Details     The patient was brought in the OR in stable condition and placed on the operative table. The right eye was prepped and draped in the usual sterile manner. Attention was directed to the right eye where a lid speculum was placed. A 2.4 mm clear corneal incision was made temperally. 1/2 cc of 1% MPF Lidocaine was irrigated into the anterior chamber followed by viscoat. The side port incision was placed superiorly. The capsularrhexis was made and the nucleus was hydrodissected with BSS. The nucleus was then removed with the phaco handpiece followed by removal of the cortical material with the I/A handpiece. The capsular bag was then filled with Provisc. The IOL was folded and placed in to the capsular bag and centered well. The remaining Provisc was removed from the eye with the I/A. The wounds were hydrated with BSS and found to be water tight. The lid speculum was removed and 2 drops of Gatifloxicin were placed over the cornea. A protective eye shield was taped over the eye and the patient went to PACU in stable condition. I will see the patient in the office tomorrow and the expected post op period is a few weeks.       Complications: None        Disposition: PACU   Condition:  Stable    SIGNATURE: Zelalem Strickland MD  DATE: August 5, 2024  TIME: 10:49 AM

## 2024-08-05 NOTE — ANESTHESIA POSTPROCEDURE EVALUATION
Post-Op Assessment Note    CV Status:  Stable  Pain Score: 0    Pain management: adequate       Mental Status:  Alert   Hydration Status:  Stable   PONV Controlled:  None   Airway Patency:  Patent     Post Op Vitals Reviewed: Yes    No anethesia notable event occurred.    Staff: Anesthesiologist, CRNA               BP   115/66   Temp      Pulse  76   Resp   14   SpO2   99

## 2024-09-09 ENCOUNTER — OFFICE VISIT (OUTPATIENT)
Dept: FAMILY MEDICINE CLINIC | Facility: CLINIC | Age: 64
End: 2024-09-09
Payer: MEDICARE

## 2024-09-09 VITALS
HEIGHT: 66 IN | OXYGEN SATURATION: 99 % | SYSTOLIC BLOOD PRESSURE: 122 MMHG | TEMPERATURE: 97.8 F | BODY MASS INDEX: 22.18 KG/M2 | WEIGHT: 138 LBS | DIASTOLIC BLOOD PRESSURE: 72 MMHG | HEART RATE: 105 BPM

## 2024-09-09 DIAGNOSIS — J06.9 VIRAL UPPER RESPIRATORY TRACT INFECTION: Primary | ICD-10-CM

## 2024-09-09 DIAGNOSIS — K59.1 FUNCTIONAL DIARRHEA: ICD-10-CM

## 2024-09-09 PROCEDURE — 99213 OFFICE O/P EST LOW 20 MIN: CPT | Performed by: FAMILY MEDICINE

## 2024-09-09 PROCEDURE — G2211 COMPLEX E/M VISIT ADD ON: HCPCS | Performed by: FAMILY MEDICINE

## 2024-09-09 NOTE — ASSESSMENT & PLAN NOTE
She  encinas d it until yesterday doing much  better  today  no  diarrhea   just one  motion today  normal  abdominal  exam no pain  dyspepsia  or   vomiting  ,  discussed   good  hydration  ORS  and  probiotic  return  parameters  discussed

## 2024-09-09 NOTE — PROGRESS NOTES
Assessment/Plan:as  below         Problem List Items Addressed This Visit          Respiratory    Viral upper respiratory tract infection - Primary     Came  in  to  get  checked  for  having  mild  fever  up to 100  and  slight runny  nose   no  fever  currently  she  says  she is  feeling much  better now  no  cp or  sob  she  says  she is  much  better  then  before  discussed   supportive  treatment , good hydration  keep  the  temp log  return  parameters  discussed  normal  exam today            Digestive    Functional diarrhea     She  ha d it until yesterday doing much  better  today  no  diarrhea   just one  motion today  normal  abdominal  exam no pain  dyspepsia  or   vomiting  ,  discussed   good  hydration  ORS  and  probiotic  return  parameters  discussed              Subjective:      Patient ID: Aura Woods is a 64 y.o. female.    Diarrhea   Pertinent negatives include no arthralgias, coughing, fever or headaches.   Fever  Associated symptoms include congestion. Pertinent negatives include no arthralgias, chest pain, coughing, fatigue, fever, headaches or rash.   -  came  in  to  get  checked   for  an  acute  visit  having    diarrhea  and  mild  fever  last 2  days  today  she is  much  better  doing well  no  efver  only once had  the  BM  no pain   no  cough  sob or  any other  complaints     The following portions of the patient's history were reviewed and updated as appropriate:   Past Medical History:  She has a past medical history of Breast cancer ([facility]) (2017), Cancer ([facility]) (August 2017), Cervical cancer ([facility]) (1989), Hyperlipidemia, and Urinary tract infection.,  _______________________________________________________________________  Medical Problems:  does not have any pertinent problems on file.,  _______________________________________________________________________  Past Surgical History:   has a past surgical history that includes Partial hysterectomy (1989); Breast lumpectomy  (Right, 2017); Breast lumpectomy (Right, 2018); Cystoscopy (2019);  section (); Breast lumpectomy; Colonoscopy (2019); Lymph node biopsy; pr xcapsl ctrc rmvl insj io lens prosth w/o ecp (Left, 2023); Hysterectomy; and pr xcapsl ctrc rmvl insj io lens prosth w/o ecp (Right, 2024).,  _______________________________________________________________________  Family History:  family history includes Breast cancer in her maternal aunt; Cancer in her maternal aunt; Lung cancer in her mother.,  _______________________________________________________________________  Social History:   reports that she quit smoking about 20 years ago. Her smoking use included cigarettes. She started smoking about 43 years ago. She has a 11.5 pack-year smoking history. She has never used smokeless tobacco. She reports current alcohol use of about 3.0 standard drinks of alcohol per week. She reports that she does not use drugs.,  _______________________________________________________________________  Allergies:  has No Known Allergies..  _______________________________________________________________________  Current Outpatient Medications   Medication Sig Dispense Refill    Ascorbic Acid (VITAMIN C) 500 MG CAPS Take by mouth      aspirin 81 MG tablet Take 81 mg by mouth      atorvastatin (LIPITOR) 20 mg tablet Take 1 tablet (20 mg total) by mouth daily at bedtime 90 tablet 3    Calcium Carb-Cholecalciferol (CALCIUM CARBONATE-VITAMIN D3 PO) Take by mouth      Cholecalciferol (VITAMIN D3) 5000 units CAPS Take by mouth      Cranberry 1000 MG CAPS Take 4,200 mg by mouth      gatifloxacin (ZYMAXID) 0.5 % Administer 1 drop to the right eye 2 (two) times a day      ketorolac (ACULAR) 0.5 % ophthalmic solution Administer 1 drop to the right eye 4 (four) times a day      letrozole (FEMARA) 2.5 mg tablet Take 2.5 mg by mouth daily      MEGARED OMEGA-3 KRILL OIL PO Take by mouth      prednisoLONE acetate (PRED  "FORTE) 1 % ophthalmic suspension Administer 1 drop to the right eye 4 (four) times a day      Vitamin E 400 units TABS Take 400 Units by mouth       No current facility-administered medications for this visit.     _______________________________________________________________________  Review of Systems   Constitutional:  Negative for fatigue and fever.   HENT:  Positive for congestion and postnasal drip.    Eyes:  Negative for pain, discharge and itching.   Respiratory:  Negative for cough and shortness of breath.    Cardiovascular:  Negative for chest pain, palpitations and leg swelling.   Gastrointestinal:  Positive for diarrhea.        Improved diarrhea   Endocrine: Negative for cold intolerance, heat intolerance and polydipsia.   Genitourinary:  Negative for dysuria and flank pain.   Musculoskeletal:  Negative for arthralgias and back pain.   Skin:  Negative for rash.   Neurological:  Negative for dizziness and headaches.   Psychiatric/Behavioral:  The patient is not nervous/anxious.          Objective:  Vitals:    09/09/24 1735   BP: 122/72   BP Location: Left arm   Patient Position: Sitting   Cuff Size: Standard   Pulse: 105   Temp: 97.8 °F (36.6 °C)   TempSrc: Temporal   SpO2: 99%   Weight: 62.6 kg (138 lb)   Height: 5' 6.42\" (1.687 m)     Body mass index is 21.99 kg/m².     Physical Exam  Vitals and nursing note reviewed.   Constitutional:       General: She is not in acute distress.     Appearance: Normal appearance. She is not ill-appearing, toxic-appearing or diaphoretic.   HENT:      Head: Normocephalic.      Nose: Congestion present.      Mouth/Throat:      Mouth: Mucous membranes are moist.      Pharynx: Oropharynx is clear. No oropharyngeal exudate or posterior oropharyngeal erythema.   Eyes:      Extraocular Movements: Extraocular movements intact.      Conjunctiva/sclera: Conjunctivae normal.      Pupils: Pupils are equal, round, and reactive to light.   Cardiovascular:      Rate and Rhythm: Normal " rate and regular rhythm.      Pulses: Normal pulses.      Heart sounds: Normal heart sounds. No murmur heard.     No gallop.   Pulmonary:      Effort: Pulmonary effort is normal.      Breath sounds: Normal breath sounds. No rhonchi or rales.   Chest:      Chest wall: No tenderness.   Abdominal:      General: Abdomen is flat. There is no distension.      Palpations: Abdomen is soft. There is no mass.   Musculoskeletal:         General: Normal range of motion.      Cervical back: Normal range of motion and neck supple. No rigidity or tenderness.      Right lower leg: No edema.      Left lower leg: No edema.   Lymphadenopathy:      Cervical: No cervical adenopathy.   Skin:     Findings: No erythema or rash.   Neurological:      General: No focal deficit present.      Mental Status: She is alert and oriented to person, place, and time.   Psychiatric:         Mood and Affect: Mood normal.         Behavior: Behavior normal.         Thought Content: Thought content normal.

## 2024-09-09 NOTE — ASSESSMENT & PLAN NOTE
Came  in  to  get  checked  for  having  mild  fever  up to 100  and  slight runny  nose   no  fever  currently  she  says  she is  feeling much  better now  no  cp or  sob  she  says  she is  much  better  then  before  discussed   supportive  treatment , good hydration  keep  the  temp log  return  parameters  discussed  normal  exam today

## 2024-09-26 ENCOUNTER — NURSE TRIAGE (OUTPATIENT)
Age: 64
End: 2024-09-26

## 2024-09-26 NOTE — TELEPHONE ENCOUNTER
"Pt calling with intermittent diarrhea x two weeks.  She states that hit occurs mostly in the evening.  At times she feels like she has a low grade temp with it.  She denies any abdominal pain or s/s dehydration.  Appt made for tomorrow morning.  She will call if her symptoms worsen before then.     Reason for Disposition   MODERATE diarrhea (e.g., 4-6 times / day more than normal) and present > 48 hours (2 days)    Answer Assessment - Initial Assessment Questions  1. DIARRHEA SEVERITY: \"How bad is the diarrhea?\" \"How many extra stools have you had in the past 24 hours than normal?\"     - NO DIARRHEA (SCALE 0)    - MILD (SCALE 1-3): Few loose or mushy BMs; increase of 1-3 stools over normal daily number of stools; mild increase in ostomy output.    -  MODERATE (SCALE 4-7): Increase of 4-6 stools daily over normal; moderate increase in ostomy output.  * SEVERE (SCALE 8-10; OR 'WORST POSSIBLE'): Increase of 7 or more stools daily over normal; moderate increase in ostomy output; incontinence.      4-5 times   2. ONSET: \"When did the diarrhea begin?\"       Over the last few weeks   3. BM CONSISTENCY: \"How loose or watery is the diarrhea?\"       Watery   4. VOMITING: \"Are you also vomiting?\" If Yes, ask: \"How many times in the past 24 hours?\"       no  5. ABDOMINAL PAIN: \"Are you having any abdominal pain?\" If Yes, ask: \"What does it feel like?\" (e.g., crampy, dull, intermittent, constant)       no  6. ABDOMINAL PAIN SEVERITY: If present, ask: \"How bad is the pain?\"  (e.g., Scale 1-10; mild, moderate, or severe)    - MILD (1-3): doesn't interfere with normal activities, abdomen soft and not tender to touch     - MODERATE (4-7): interferes with normal activities or awakens from sleep, tender to touch     - SEVERE (8-10): excruciating pain, doubled over, unable to do any normal activities        no  7. ORAL INTAKE: If vomiting, \"Have you been able to drink liquids?\" \"How much fluids have you had in the past 24 hours?\"      " "Yes   8. HYDRATION: \"Any signs of dehydration?\" (e.g., dry mouth [not just dry lips], too weak to stand, dizziness, new weight loss) \"When did you last urinate?\"      no  9. EXPOSURE: \"Have you traveled to a foreign country recently?\" \"Have you been exposed to anyone with diarrhea?\" \"Could you have eaten any food that was spoiled?\"      no  10. ANTIBIOTIC USE: \"Are you taking antibiotics now or have you taken antibiotics in the past 2 months?\"        no  11. OTHER SYMPTOMS: \"Do you have any other symptoms?\" (e.g., fever, blood in stool)        Low grade temp at times   12. PREGNANCY: \"Is there any chance you are pregnant?\" \"When was your last menstrual period?\"        no    Protocols used: Diarrhea-ADULT-[place]    "

## 2024-09-26 NOTE — TELEPHONE ENCOUNTER
Regarding: diarrhea with possible blood  ----- Message from Daya TAYLOR sent at 9/26/2024  9:05 AM EDT -----  Message sent via [employer].   Sidra, this is Lisette Woods (b'd[birth year]).  I last saw you the end of June.  I am having an issue with my bowels.  It's almost like diarrhea but it's almost clear sometimes.  It's hard to explain.  Started a few weeks ago and sometimes it's not an issue for 1/2 day but then starts up again. I was up 4 to 5 times last night.  The color is almost black.  Not much is there each time.  Just enough for me to have to use the bathroom.  I called yesterday for an appt. to see you but can't get in until 10/7.  I prefer to see you.  Any suggestions?  Pepto Bismol doesn't seem to help at all.  Please let me know.  Lisette Woods

## 2024-09-27 ENCOUNTER — OFFICE VISIT (OUTPATIENT)
Dept: FAMILY MEDICINE CLINIC | Facility: CLINIC | Age: 64
End: 2024-09-27
Payer: MEDICARE

## 2024-09-27 ENCOUNTER — APPOINTMENT (OUTPATIENT)
Dept: LAB | Facility: CLINIC | Age: 64
End: 2024-09-27

## 2024-09-27 VITALS
BODY MASS INDEX: 22.18 KG/M2 | TEMPERATURE: 98 F | OXYGEN SATURATION: 93 % | DIASTOLIC BLOOD PRESSURE: 80 MMHG | HEIGHT: 66 IN | RESPIRATION RATE: 17 BRPM | HEART RATE: 95 BPM | WEIGHT: 138 LBS | SYSTOLIC BLOOD PRESSURE: 124 MMHG

## 2024-09-27 DIAGNOSIS — R19.7 DIARRHEA OF PRESUMED INFECTIOUS ORIGIN: Primary | ICD-10-CM

## 2024-09-27 DIAGNOSIS — R19.7 DIARRHEA, UNSPECIFIED TYPE: ICD-10-CM

## 2024-09-27 DIAGNOSIS — R19.7 DIARRHEA OF PRESUMED INFECTIOUS ORIGIN: ICD-10-CM

## 2024-09-27 PROCEDURE — 99213 OFFICE O/P EST LOW 20 MIN: CPT | Performed by: NURSE PRACTITIONER

## 2024-09-27 PROCEDURE — G2211 COMPLEX E/M VISIT ADD ON: HCPCS | Performed by: NURSE PRACTITIONER

## 2024-09-27 NOTE — PROGRESS NOTES
Assessment/Plan:    1. Diarrhea of presumed infectious origin  -     Stool Enteric Bacterial Panel by PCR; Future  -     Calprotectin,Fecal; Future  -     Clostridium difficile toxin by PCR with EIA; Future  -     Fecal leukocytes; Future  2. Diarrhea, unspecified type  -     Stool Enteric Bacterial Panel by PCR; Future  -     Calprotectin,Fecal; Future  -     Clostridium difficile toxin by PCR with EIA; Future  -     Fecal leukocytes; Future        The case discussed with patient using patient centered shared decision making.The patient was counseled regarding instructions for management,-- risk factor reductions,-- prognosis,-- impressions,-- risks and benefits of treatment options,-- importance of compliance with treatment. I have reviewed the instructions with the patient, answering all questions to her satisfaction.    Escalating watery diarrhea of unclear etiology  Recommend starting with workup to rule out infectious diarrhea-stool testing  Patient agrees to this plan  [place] diet, stay hydrated and take in enough electrolytes  Treatment plan to be determined by stool testing.  If this is unrevealing we will discuss next step    There are no Patient Instructions on file for this visit.    Return Will call to discuss testing.    Subjective:      Patient ID: Aura Woods is a 64 y.o. female.    Chief Complaint   Patient presents with    Diarrhea     Intermittent for past few weeks       64-year-old female well-known to me presents for same-day visit  She has been having escalating diarrhea since viral illness 9/9/24-she was seen here in this office for a same-day visit  Episodes have been waxing and waning  She cannot identify triggers that are provoking diarrhea  Diarrhea is watery with bits of solids  Foul-smelling  No recent travel  Denies frequent antibiotic use  Denies abdominal pain or bleeding associated with the symptoms  Denies fevers    Diarrhea   This is a new problem. The current episode started 1  to 4 weeks ago. The problem has been gradually worsening. The stool consistency is described as Watery. The patient states that diarrhea awakens her from sleep. Pertinent negatives include no abdominal pain, arthralgias, bloating, chills, fever, vomiting or weight loss. Nothing aggravates the symptoms. There are no known risk factors. She has tried anti-motility drug and bismuth subsalicylate (Imodium) for the symptoms. The treatment provided no relief. There is no history of bowel resection, inflammatory bowel disease, irritable bowel syndrome or a recent abdominal surgery.     The following portions of the patient's history were reviewed and updated as appropriate: allergies, current medications, past family history, past medical history, past social history, past surgical history and problem list.    Review of Systems   Constitutional:  Negative for chills, fever and weight loss.   Gastrointestinal:  Positive for diarrhea. Negative for abdominal distention, abdominal pain, bloating, blood in stool, nausea and vomiting.   Genitourinary:  Negative for difficulty urinating and pelvic pain.        Chronic retention-she self caths   Musculoskeletal:  Negative for arthralgias.   Neurological:  Negative for dizziness and weakness.         Current Outpatient Medications   Medication Sig Dispense Refill    Ascorbic Acid (VITAMIN C) 500 MG CAPS Take by mouth      aspirin 81 MG tablet Take 81 mg by mouth      atorvastatin (LIPITOR) 20 mg tablet Take 1 tablet (20 mg total) by mouth daily at bedtime 90 tablet 3    Calcium Carb-Cholecalciferol (CALCIUM CARBONATE-VITAMIN D3 PO) Take by mouth      Cholecalciferol (VITAMIN D3) 5000 units CAPS Take by mouth      Cranberry 1000 MG CAPS Take 4,200 mg by mouth      gatifloxacin (ZYMAXID) 0.5 % Administer 1 drop to the right eye 2 (two) times a day      ketorolac (ACULAR) 0.5 % ophthalmic solution Administer 1 drop to the right eye 4 (four) times a day      letrozole (FEMARA) 2.5 mg  "tablet Take 2.5 mg by mouth daily      MEGARED OMEGA-3 KRILL OIL PO Take by mouth      prednisoLONE acetate (PRED FORTE) 1 % ophthalmic suspension Administer 1 drop to the right eye 4 (four) times a day      Vitamin E 400 units TABS Take 400 Units by mouth       No current facility-administered medications for this visit.       Objective:    /80 (BP Location: Left arm, Patient Position: Sitting, Cuff Size: Standard)   Pulse 95   Temp 98 °F (36.7 °C) (Temporal)   Resp 17   Ht 5' 6.42\" (1.687 m)   Wt 62.6 kg (138 lb)   SpO2 93%   BMI 21.99 kg/m²        Physical Exam  Vitals and nursing note reviewed.   Constitutional:       Appearance: Normal appearance.   Cardiovascular:      Rate and Rhythm: Normal rate and regular rhythm.   Pulmonary:      Effort: Pulmonary effort is normal.      Breath sounds: Normal breath sounds.   Abdominal:      General: Bowel sounds are normal. There is no distension.      Palpations: Abdomen is soft. There is no mass.      Tenderness: There is no abdominal tenderness.   Skin:     General: Skin is warm and dry.      Coloration: Skin is not pale.   Neurological:      General: No focal deficit present.      Mental Status: She is alert. Mental status is at baseline.                NAYELI Asif  "

## 2024-10-01 ENCOUNTER — APPOINTMENT (OUTPATIENT)
Dept: LAB | Facility: CLINIC | Age: 64
End: 2024-10-01
Payer: MEDICARE

## 2024-10-01 ENCOUNTER — HOSPITAL ENCOUNTER (OUTPATIENT)
Dept: ULTRASOUND IMAGING | Facility: HOSPITAL | Age: 64
Discharge: HOME | End: 2024-10-01
Payer: MEDICARE

## 2024-10-01 DIAGNOSIS — N31.9 NEUROGENIC BLADDER: ICD-10-CM

## 2024-10-01 PROCEDURE — 89055 LEUKOCYTE ASSESSMENT FECAL: CPT

## 2024-10-01 PROCEDURE — 76775 US EXAM ABDO BACK WALL LIM: CPT

## 2024-10-01 PROCEDURE — 87505 NFCT AGENT DETECTION GI: CPT

## 2024-10-02 LAB
C COLI+JEJUNI TUF STL QL NAA+PROBE: NEGATIVE
EC STX1+STX2 GENES STL QL NAA+PROBE: NEGATIVE
SALMONELLA SP SPAO STL QL NAA+PROBE: NEGATIVE
SHIGELLA SP+EIEC IPAH STL QL NAA+PROBE: NEGATIVE
WBC SPEC QL GRAM STN: NORMAL

## 2024-10-09 PROBLEM — J06.9 VIRAL UPPER RESPIRATORY TRACT INFECTION: Status: RESOLVED | Noted: 2024-09-09 | Resolved: 2024-10-09

## 2024-10-10 ENCOUNTER — OFFICE VISIT (OUTPATIENT)
Dept: UROLOGY | Facility: CLINIC | Age: 64
End: 2024-10-10
Payer: MEDICARE

## 2024-10-10 VITALS
BODY MASS INDEX: 22.02 KG/M2 | WEIGHT: 137 LBS | SYSTOLIC BLOOD PRESSURE: 122 MMHG | OXYGEN SATURATION: 97 % | DIASTOLIC BLOOD PRESSURE: 80 MMHG | HEART RATE: 93 BPM | HEIGHT: 66 IN

## 2024-10-10 DIAGNOSIS — N20.0 KIDNEY STONES: Primary | ICD-10-CM

## 2024-10-10 PROCEDURE — 99213 OFFICE O/P EST LOW 20 MIN: CPT | Performed by: PHYSICIAN ASSISTANT

## 2024-10-10 NOTE — PROGRESS NOTES
UROLOGY PROGRESS NOTE   Patient Identifiers: Aura Woods (MRN [id number])  Date of Service: 10/10/2024    Subjective:   64-year-old female history of chronic urinary retention and neurogenic bladder.  She does CIC 3 times per day.  History of hysterectomy and radiation for cervical cancer.  Ultrasound shows stable mild right hydronephrosis and nonobstructing right renal calculi.  No UTI.  No issues with cathing    Reason for visit: Chronic urinary retention follow-up    Objective:     VITALS:    There were no vitals filed for this visit.        LABS:  Lab Results   Component Value Date    HGB 12.4 08/01/2024    HCT 38.9 08/01/2024    WBC 6.32 08/01/2024     08/01/2024   ]    Lab Results   Component Value Date     10/22/2015    K 3.9 08/01/2024     08/01/2024    CO2 27 08/01/2024    BUN 12 08/01/2024    CREATININE 0.74 08/01/2024    CALCIUM 9.2 08/01/2024    GLUCOSE 92 10/22/2015   ]        INPATIENT MEDS:    Current Outpatient Medications:     Ascorbic Acid (VITAMIN C) 500 MG CAPS, Take by mouth, Disp: , Rfl:     aspirin 81 MG tablet, Take 81 mg by mouth, Disp: , Rfl:     atorvastatin (LIPITOR) 20 mg tablet, Take 1 tablet (20 mg total) by mouth daily at bedtime, Disp: 90 tablet, Rfl: 3    Calcium Carb-Cholecalciferol (CALCIUM CARBONATE-VITAMIN D3 PO), Take by mouth, Disp: , Rfl:     Cholecalciferol (VITAMIN D3) 5000 units CAPS, Take by mouth, Disp: , Rfl:     Cranberry 1000 MG CAPS, Take 4,200 mg by mouth, Disp: , Rfl:     gatifloxacin (ZYMAXID) 0.5 %, Administer 1 drop to the right eye 2 (two) times a day, Disp: , Rfl:     ketorolac (ACULAR) 0.5 % ophthalmic solution, Administer 1 drop to the right eye 4 (four) times a day, Disp: , Rfl:     letrozole (FEMARA) 2.5 mg tablet, Take 2.5 mg by mouth daily, Disp: , Rfl:     MEGARED OMEGA-3 KRILL OIL PO, Take by mouth, Disp: , Rfl:     prednisoLONE acetate (PRED FORTE) 1 % ophthalmic suspension, Administer 1 drop to the right eye 4 (four) times a  day, Disp: , Rfl:     Vitamin E 400 units TABS, Take 400 Units by mouth, Disp: , Rfl:       Physical Exam:   There were no vitals taken for this visit.  GEN: no acute distress    RESP: breathing comfortably with no accessory muscle use    ABD: soft, non-tender, non-distended   INCISION:    EXT: no significant peripheral edema       RADIOLOGY:   IMPRESSION:     Stable mild right hydronephrosis and left pelviectasis without caliectasis.     Nonobstructing right renal calculus.     Distended urinary bladder.     Assessment:   #1.  Chronic urinary retention with neurogenic bladder  #2.  Nephrolithiasis    Plan:   -We discussed lithotripsy  -She would like to follow-up in 1 year  -Will get a KUB prior to visit  -

## 2024-10-24 ENCOUNTER — TELEPHONE (OUTPATIENT)
Dept: GASTROENTEROLOGY | Facility: CLINIC | Age: 64
End: 2024-10-24

## 2024-10-24 NOTE — TELEPHONE ENCOUNTER
Pt is due for a 5 yr colon recall. Hist polyps, cervical cancer. Frm Dr. Bishop pt.  Called and spoke to pt and made aware that she was due, however, she could not schedule and asked me to call her back to leave her a message with the info and number. Will mail recall letter as reminder if do not hear back from pt.

## 2025-03-05 ENCOUNTER — APPOINTMENT (OUTPATIENT)
Dept: LAB | Facility: CLINIC | Age: 65
End: 2025-03-05
Payer: MEDICARE

## 2025-03-05 DIAGNOSIS — C50.411 MALIGNANT NEOPLASM OF UPPER-OUTER QUADRANT OF RIGHT FEMALE BREAST, UNSPECIFIED ESTROGEN RECEPTOR STATUS (HCC): ICD-10-CM

## 2025-03-05 DIAGNOSIS — Z17.0 ESTROGEN RECEPTOR POSITIVE: ICD-10-CM

## 2025-03-05 LAB
ALBUMIN SERPL BCG-MCNC: 4.5 G/DL (ref 3.5–5)
ALP SERPL-CCNC: 54 U/L (ref 34–104)
ALT SERPL W P-5'-P-CCNC: 16 U/L (ref 7–52)
ANION GAP SERPL CALCULATED.3IONS-SCNC: 7 MMOL/L (ref 4–13)
AST SERPL W P-5'-P-CCNC: 20 U/L (ref 13–39)
BILIRUB SERPL-MCNC: 1.16 MG/DL (ref 0.2–1)
BUN SERPL-MCNC: 15 MG/DL (ref 5–25)
CALCIUM SERPL-MCNC: 9.5 MG/DL (ref 8.4–10.2)
CHLORIDE SERPL-SCNC: 103 MMOL/L (ref 96–108)
CO2 SERPL-SCNC: 30 MMOL/L (ref 21–32)
CREAT SERPL-MCNC: 0.76 MG/DL (ref 0.6–1.3)
GFR SERPL CREATININE-BSD FRML MDRD: 82 ML/MIN/1.73SQ M
GLUCOSE P FAST SERPL-MCNC: 104 MG/DL (ref 65–99)
POTASSIUM SERPL-SCNC: 4 MMOL/L (ref 3.5–5.3)
PROT SERPL-MCNC: 7.1 G/DL (ref 6.4–8.4)
SODIUM SERPL-SCNC: 140 MMOL/L (ref 135–147)

## 2025-03-05 PROCEDURE — 80053 COMPREHEN METABOLIC PANEL: CPT

## 2025-03-05 PROCEDURE — 36415 COLL VENOUS BLD VENIPUNCTURE: CPT

## 2025-08-06 DIAGNOSIS — E78.2 MIXED HYPERLIPIDEMIA: ICD-10-CM

## 2025-08-07 RX ORDER — ATORVASTATIN CALCIUM 20 MG/1
20 TABLET, FILM COATED ORAL
Qty: 30 TABLET | Refills: 0 | Status: SHIPPED | OUTPATIENT
Start: 2025-08-07

## (undated) DEVICE — GLOVE SRG BIOGEL 7.5

## (undated) DEVICE — MICROSURGICAL INSTRUMENT IRR. CYSTITOME 25GA STRAIGHT-REVERSE CUTTING: Brand: ALCON

## (undated) DEVICE — B-H IRRIGATING CAN 19GA FLAT ANGLED 8MM: Brand: OPHTHALMIC CANNULA

## (undated) DEVICE — ACTIVE FMS W/ INTREPID* ULTRA SLEEVES, 0.9MM 45° ABS* INTREPID* BALANCED TIP: Brand: ALCON

## (undated) DEVICE — THE MONARCH® "D" CARTRIDGE IS A SINGLE-USE POLYPROPYLENE CARTRIDGE FOR POSTERIOR CHAMBER IOL DELIVERY: Brand: MONARCH® III

## (undated) DEVICE — EYE PACK CUSTOM -FINNEGAN

## (undated) DEVICE — CLEARCUT® SLIT KNIFE INTREPID MICRO-COAXIAL SYSTEM 2.4 SB: Brand: CLEARCUT®; INTREPID

## (undated) DEVICE — INTREPID® TRANSFORMER IA HP: Brand: INTREPID®

## (undated) DEVICE — AIR INJECT CANNULA 27GA: Brand: OPHTHALMIC CANNULA

## (undated) DEVICE — CENTURION VISION SYSTEM FMS